# Patient Record
Sex: FEMALE | Race: WHITE
[De-identification: names, ages, dates, MRNs, and addresses within clinical notes are randomized per-mention and may not be internally consistent; named-entity substitution may affect disease eponyms.]

---
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
## 2019-04-22 ENCOUNTER — HOSPITAL ENCOUNTER (EMERGENCY)
Dept: HOSPITAL 25 - ED | Age: 19
LOS: 1 days | Discharge: HOME | End: 2019-04-23
Payer: COMMERCIAL

## 2019-04-22 DIAGNOSIS — R45.851: ICD-10-CM

## 2019-04-22 DIAGNOSIS — F32.9: Primary | ICD-10-CM

## 2019-04-22 LAB
ALBUMIN SERPL BCG-MCNC: 4.8 G/DL (ref 3.2–5.2)
ALBUMIN/GLOB SERPL: 1.7 {RATIO} (ref 1–3)
ALP SERPL-CCNC: 72 U/L (ref 34–104)
ALT SERPL W P-5'-P-CCNC: 33 U/L (ref 7–52)
ANION GAP SERPL CALC-SCNC: 8 MMOL/L (ref 2–11)
APAP SERPL-MCNC: < 15 MCG/ML
AST SERPL-CCNC: 23 U/L (ref 13–39)
BASOPHILS # BLD AUTO: 0.1 10^3/UL (ref 0–0.2)
BUN SERPL-MCNC: 14 MG/DL (ref 6–24)
BUN/CREAT SERPL: 14.6 (ref 8–20)
CALCIUM SERPL-MCNC: 10 MG/DL (ref 8.6–10.3)
CHLORIDE SERPL-SCNC: 104 MMOL/L (ref 101–111)
EOSINOPHIL # BLD AUTO: 0.1 10^3/UL (ref 0–0.6)
GLOBULIN SER CALC-MCNC: 2.8 G/DL (ref 2–4)
GLUCOSE SERPL-MCNC: 95 MG/DL (ref 70–100)
HCG SERPL QL: 1.92 MIU/ML
HCO3 SERPL-SCNC: 26 MMOL/L (ref 22–32)
HCT VFR BLD AUTO: 42 % (ref 33–41)
HGB BLD-MCNC: 13.9 G/DL (ref 12–16)
LYMPHOCYTES # BLD AUTO: 1.9 10^3/UL (ref 1–4.8)
MCH RBC QN AUTO: 28 PG (ref 27–31)
MCHC RBC AUTO-ENTMCNC: 33 G/DL (ref 31–36)
MCV RBC AUTO: 86 FL (ref 80–97)
MONOCYTES # BLD AUTO: 0.6 10^3/UL (ref 0–0.8)
NEUTROPHILS # BLD AUTO: 6.2 10^3/UL (ref 1.5–7.7)
NRBC # BLD AUTO: 0 10^3/UL
NRBC BLD QL AUTO: 0.1
PLATELET # BLD AUTO: 269 10^3/UL (ref 150–450)
POTASSIUM SERPL-SCNC: 3.9 MMOL/L (ref 3.5–5)
PROT SERPL-MCNC: 7.6 G/DL (ref 6.4–8.9)
RBC # BLD AUTO: 4.89 10^6 /UL (ref 3.7–4.87)
RBC UR QL AUTO: (no result)
SALICYLATES SERPL-MCNC: < 2.5 MG/DL (ref ?–30)
SODIUM SERPL-SCNC: 138 MMOL/L (ref 135–145)
TSH SERPL-ACNC: 2.03 MCIU/ML (ref 0.34–5.6)
WBC # BLD AUTO: 9 10^3/UL (ref 3.5–10.8)
WBC UR QL AUTO: (no result)

## 2019-04-22 PROCEDURE — 85025 COMPLETE CBC W/AUTO DIFF WBC: CPT

## 2019-04-22 PROCEDURE — 87086 URINE CULTURE/COLONY COUNT: CPT

## 2019-04-22 PROCEDURE — 80053 COMPREHEN METABOLIC PANEL: CPT

## 2019-04-22 PROCEDURE — 81015 MICROSCOPIC EXAM OF URINE: CPT

## 2019-04-22 PROCEDURE — 80307 DRUG TEST PRSMV CHEM ANLYZR: CPT

## 2019-04-22 PROCEDURE — 80329 ANALGESICS NON-OPIOID 1 OR 2: CPT

## 2019-04-22 PROCEDURE — 87077 CULTURE AEROBIC IDENTIFY: CPT

## 2019-04-22 PROCEDURE — 84702 CHORIONIC GONADOTROPIN TEST: CPT

## 2019-04-22 PROCEDURE — 81003 URINALYSIS AUTO W/O SCOPE: CPT

## 2019-04-22 PROCEDURE — 84443 ASSAY THYROID STIM HORMONE: CPT

## 2019-04-22 PROCEDURE — 99285 EMERGENCY DEPT VISIT HI MDM: CPT

## 2019-04-22 PROCEDURE — 80320 DRUG SCREEN QUANTALCOHOLS: CPT

## 2019-04-22 PROCEDURE — 36415 COLL VENOUS BLD VENIPUNCTURE: CPT

## 2019-04-22 PROCEDURE — G0480 DRUG TEST DEF 1-7 CLASSES: HCPCS

## 2019-04-22 NOTE — ED
Psychiatric Complaint





- HPI Summary


HPI Summary: 


Patient is a 17 y/o female brought in by EMS and police who presents to the ED c

/o possible SI. She is here voluntarily. 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 [facility] athletic department called 

the police because they were concerned about her. The police were searching 

through her [employer] michelle on her phone and came across a post several days ago, 

in which the patient was talking about how she threatened SI last semester. 

Patient denies any current SI and states Im unsure why Im here. She notes 

she used marijuana once several months ago. PMHx bipolar disorder and OCD, and 

patient is on Latuda and Lamictal.





- History Of Current Complaint


Time Seen by Provider: 04/22/19 18:15


Hx Obtained From: Patient, EMS


Onset/Duration: Gradual Onset, Still Present


Aggravating Factor(s): Recent Stress, Medication Non-compliance - suspended 

from soccer team


Related History: Positive For: Prior Psychiatric Issues


Has Suicidal: Denies: Thoughts





- Allergies/Home Medications


Allergies/Adverse Reactions: 


 Allergies











Allergy/AdvReac Type Severity Reaction Status Date / Time


 


No Known Allergies Allergy   Verified 12/12/18 22:25











Home Medications: 


 Home Medications





Etonogestrel [Nexplanon] 68 mg IMPLANT ONCE 04/22/19 [History Confirmed 04/22/19

]


Lurasidone(*) [Latuda] 80 mg PO DAILY 04/22/19 [History Confirmed 04/22/19]


Lurasidone(*) [Latuda] 80 mg pe PO DAILY WITH MEAL 04/22/19 [History Confirmed 

04/22/19]


lamoTRIgine [Lamotrigine ER] 200 mg PO DAILY 04/22/19 [History Confirmed 04/22/ 19]


lamoTRIgine [Lamotrigine ER] 200 mg pe PO DAILY WITH MEAL 04/22/19 [History 

Confirmed 04/22/19]











PMH/Surg Hx/FS Hx/Imm Hx


Endocrine/Hematology History: 


   Denies: Hx Anticoagulant Therapy, Hx Diabetes


Cardiovascular History: 


   Denies: Hx Hypertension


Respiratory History: Reports: Hx Asthma


 History: 


   Denies: Hx Renal Disease


Psychiatric History: Reports: Hx Bipolar Disorder, Other Psychiatric Issues/

Disorders - OCD





- Surgical History


Surgery Procedure, Year, and Place: tonsillectomy/adenoidectomy, biopsy of left 

groin lymph node


Infectious Disease History: No


Infectious Disease History: 


   Denies: Traveled Outside the [place] in Last 30 Days





- Family History


Known Family History: 


   Negative: Diabetes





- Social History


Alcohol Use: Rare


Hx Substance Use: Yes


Substance Use Type: Reports: Marijuana


Substance Use Comment - Amount & Last Used: 2 months ago


Hx Tobacco Use: No


Smoking Status (MU): Never Smoked Tobacco





Review of Systems


Negative: Fever


Negative: Other - SI


All Other Systems Reviewed And Are Negative: Yes





Physical Exam





- Summary


Physical Exam Summary: 


Appearance: Well appearing, no pain distress


Skin: warm, dry, reflects adequate perfusion


Head/face: normal


Eyes: EOMI, ELEANOR


ENT: normal


Neck: supple, non-tender


Respiratory: CTA, breath sounds present


Cardiovascular: RRR, pulses symmetrical 


Abdomen: non-tender, soft


Musculoskeletal: normal, strength/ROM intact


Neuro: normal, sensory motor intact, A&Ox3


Psych: depressed, crying


Triage Information Reviewed: Yes


Vital Signs On Initial Exam: 


 Initial Vitals











Temp Pulse Resp BP Pulse Ox


 


 98.2 F   80   18   130/80   99 


 


 04/22/19 18:25  04/22/19 18:25  04/22/19 18:25  04/22/19 18:25  04/22/19 18:25











Vital Signs Reviewed: Yes





Diagnostics





- Vital Signs


 Vital Signs











  Temp Pulse Resp BP Pulse Ox


 


 04/22/19 18:25  98.2 F  80  18  130/80  99














- Laboratory


Lab Results: 


 Lab Results











  04/22/19 04/22/19 04/22/19 Range/Units





  18:15 18:31 18:31 


 


WBC   9.0   (3.5-10.8)  10^3/uL


 


RBC   4.89 H   (3.70-4.87)  10^6 /uL


 


Hgb   13.9   (12.0-16.0)  g/dL


 


Hct   42 H   (33-41)  %


 


MCV   86   (80-97)  fL


 


MCH   28   (27-31)  pg


 


MCHC   33   (31-36)  g/dL


 


RDW   15   (10.5-15)  %


 


Plt Count   269   (150-450)  10^3/uL


 


MPV   8.1   (7.4-10.4)  fL


 


Neut % (Auto)   69.4   %


 


Lymph % (Auto)   21.3   %


 


Mono % (Auto)   6.7   %


 


Eos % (Auto)   1.6   %


 


Baso % (Auto)   1.0   %


 


Absolute Neuts (auto)   6.2   (1.5-7.7)  10^3/ul


 


Absolute Lymphs (auto)   1.9   (1.0-4.8)  10^3/ul


 


Absolute Monos (auto)   0.6   (0-0.8)  10^3/ul


 


Absolute Eos (auto)   0.1   (0-0.6)  10^3/ul


 


Absolute Basos (auto)   0.1   (0-0.2)  10^3/ul


 


Absolute Nucleated RBC   0   10^3/ul


 


Nucleated RBC %   0.1   


 


Sodium    138  (135-145)  mmol/L


 


Potassium    3.9  (3.5-5.0)  mmol/L


 


Chloride    104  (101-111)  mmol/L


 


Carbon Dioxide    26  (22-32)  mmol/L


 


Anion Gap    8  (2-11)  mmol/L


 


BUN    14  (6-24)  mg/dL


 


Creatinine    0.96 H  (0.51-0.95)  mg/dL


 


Est GFR ( Amer)    90.6  (>60)  


 


Est GFR (Non-Af Amer)    74.9  (>60)  


 


BUN/Creatinine Ratio    14.6  (8-20)  


 


Glucose    95  ()  mg/dL


 


Calcium    10.0  (8.6-10.3)  mg/dL


 


Total Bilirubin    0.70  (0.2-1.0)  mg/dL


 


AST    23  (13-39)  U/L


 


ALT    33  (7-52)  U/L


 


Alkaline Phosphatase    72  ()  U/L


 


Total Protein    7.6  (6.4-8.9)  g/dL


 


Albumin    4.8  (3.2-5.2)  g/dL


 


Globulin    2.8  (2-4)  g/dL


 


Albumin/Globulin Ratio    1.7  (1-3)  


 


TSH    Pending  


 


Beta HCG, Quant    1.92  mIU/mL


 


Urine Color  Yellow    


 


Urine Appearance  Cloudy    


 


Urine pH  5.0    (5-9)  


 


Ur Specific Gravity  1.025    (1.010-1.030)  


 


Urine Protein  Negative    (Negative)  


 


Urine Ketones  Trace A    (Negative)  


 


Urine Blood  3+ A    (Negative)  


 


Urine Nitrate  Negative    (Negative)  


 


Urine Bilirubin  Negative    (Negative)  


 


Urine Urobilinogen  Negative    (Negative)  


 


Ur Leukocyte Esterase  Negative    (Negative)  


 


Urine WBC (Auto)  Trace(0-5/hpf)    (Absent)  


 


Urine RBC (Auto)  Trace(0-2/hpf)    (Absent)  


 


Ur Squamous Epith Cells  Present A    (Absent)  


 


Urine Bacteria  Absent    (Absent)  


 


Urine Glucose  Negative    (Negative)  


 


Salicylates    Pending  


 


Acetaminophen    Pending  


 


Serum Alcohol    Pending  











Result Diagrams: 


 04/22/19 18:31





 04/22/19 18:31


Lab Statement: Any lab studies that have been ordered have been reviewed, and 

results considered in the medical decision making process.





Re-Evaluation





- Re-Evaluation


  ** First Eval


Re-Evaluation Time: 19:15


Change: Unchanged


Comment: Pt is medically cleared for a MHE.





Course/Dx





- Course


Course Of Treatment: Patient is a 17 y/o female brought in by EMS and police 

who presents to the ED c/o possible SI. Patient denies any current SI and 

states Im unsure why Im here. PMHx bipolar disorder and OCD, and patient is 

on Latuda and Lamictal. A physical exam revealed depressed and crying. Bloodwork

/UA obtained. Patient will be signed out to Dr. Walker at shift change, pending 

MHE. Final dx of depression and SI. She is agreeable with this plan.





- Differential Dx/Clinical Impression


Differential Diagnosis/HQI/PQRI: Positive: Suicidal Ideation


Provider Diagnosis: 


 Depression, Suicidal ideation








Discharge





- Sign-Out/Discharge


Documenting (check all that apply): Sign-Out Patient


Signing out patient TO: Klever Walker


Patient Received Moderate/Deep Sedation with Procedure: No





- Discharge Plan


Referrals: 


No Primary Care Phys,NOPCP [Primary Care Provider] - 





- Attestation Statements


Document Initiated by Scribe: Yes


Documenting Scribe: Deena Reyes


Provider For Whom Scribe is Documenting (Include Credential): Tal Caldwell MD


Scribe Attestation: 


Deena GERONIMO, scribed for Tal Caldwell MD on 04/22/19 at 2108. 


Scribe Documentation Reviewed: Yes


Provider Attestation: 


The documentation as recorded by the Deena frankel accurately reflects the 

service I personally performed and the decisions made by me, Tal Caldwell MD


Status of Scribe Document: Viewed

## 2019-04-22 NOTE — ED
Progress





- Progress Note


Progress Note: 





RECEIVING SIGN OUT FROM DR. TOLEDO AT SHIFT CHANGE, PENDING MHE.





Pt is a 19 y/o F brought in by ambulance and police presenting to ED for MHE 

due to possible SI. 





- Consult/PCP


Time Called: 19:37





Re-Evaluation





- Re-Evaluation


  ** First Eval


Re-Evaluation Time: 19:15


Change: Unchanged


Comment: Pt is medically cleared for a MHE.





Course/Dx





- Course


Course Of Treatment: RECEIVING SIGN OUT FROM DR. TOLEDO AT SHIFT CHANGE, 

PENDING MHE.  Pt is a 19 y/o F brought in by ambulance and police presenting to 

ED for MHE due to possible SI.  Pt is on MH hold for re-eval in the AM.  

SIGNING PT OUT TO DR. TOLEDO AT SHIFT CHANGE PENDING MHE.





- Diagnoses


Provider Diagnoses: 


 Depression, Suicidal ideation








Discharge





- Sign-Out/Discharge


Documenting (check all that apply): Sign-Out Patient, Receiving Sign-Out


Signing out patient TO: Tal Toledo - pending MHE


Receiving patient FROM: Tal Toledo - pending MHE


Patient Received Moderate/Deep Sedation with Procedure: No





- Discharge Plan


Condition: Good


Referrals: 


No Primary Care Phys,NOPCP [Primary Care Provider] - 





- Billing Disposition and Condition


Condition: GOOD





- Attestation Statements


Document Initiated by Scribe: Yes


Documenting Scribe: Angélica Lane


Provider For Whom Scribe is Documenting (Include Credential): Dr. Klever Walker MD


Scribe Attestation: 


Angélica GERONIMO scribed for Dr. Klever Walker MD on 04/23/19 at 0430. 


Scribe Documentation Reviewed: Yes


Provider Attestation: 


The documentation as recorded by the Angélica frankel accurately 

reflects the service I personally performed and the decisions made by me, Dr. Klever Walker MD


Status of Scribe Document: Viewed

## 2019-04-23 VITALS — DIASTOLIC BLOOD PRESSURE: 68 MMHG | SYSTOLIC BLOOD PRESSURE: 127 MMHG

## 2019-04-23 NOTE — ED
Progress





- Progress Note


Progress Note: 





This patient was signed out from Dr. Walker to Dr. Caldwell upon shift change at 

07:00 04/23/19 pending MHE. Dr. Sue reports that the patient has been cleared 

for discharge. Dx: depression





- Consult/PCP


Time Called: 19:37





Re-Evaluation





- Re-Evaluation


  ** First Eval


Re-Evaluation Time: 19:15


Change: Unchanged


Comment: Pt is medically cleared for a MHE.





Course/Dx





- Course


Course Of Treatment: This patient was signed out from Dr. Walker to Dr. Caldwell 

upon shift change at 07:00 04/23/19 pending MHE. Dr. Sue reports that the 

patient has been cleared for discharge. Dx: depression





- Diagnoses


Provider Diagnoses: 


 Depression








- Provider Notifications


Discussed Care Of Patient With: Osito Sue


Time Discussed With Above Provider: 09:00


Instructed by Provider To: Other - Dr. Sue reports that the patient has been 

cleared for discharge. Dx: depression





Discharge





- Sign-Out/Discharge


Documenting (check all that apply): Patient Departure - DC


Patient Received Moderate/Deep Sedation with Procedure: No





- Discharge Plan


Condition: Stable


Disposition: HOME


Referrals: 


No Primary Care Phys,NOPCP [Primary Care Provider] - 


Additional Instructions: 


Patient to be discharged from ED.  Patient had been admitted to the ED after 

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  

Patient also stated that she had been suspended from the soccer team for 

drinking.  Patient states that the post was from December, and she currently 

denies that she has SI or SP.  Patient states that she has support of a friend 

who is not on the soccer team.  She also stated that she does not mind if she 

is permanently kicked off the soccer team.  Patient sees Meghana Kelly 

psychiatrist at [facility] services for counseling and medication.  

This writer confirmed that patient has an appointment for today at 1 PM.








IMPORTANT PHONE NUMBERS:





[facility] Behavioral Services Unit: [phone]


[facility] Emergency Room Behavioral Pod: [phone]





Suicide Prevention and Crisis Services: [phone]


The Chat: Text (871) 028-5584 (free and confidential online crisis service 

sponsored by [facility] Suicide Prevention, available Monday-Friday 6pm  

9pm)


National Suicide Prevention Lifeline: (181) 295-WNED (4265)


National Crisis Text Line: Text HELLO to 905157


[facility]: [phone]


[facility] Outreach for Older Adults: [phone]


[facility] Mental Health Association: [phone]


[facility]: [phone]





Alcoholics Anonymous: [phone]


Narcotics Anonymous: [phone] 956-8952


Alcohol and Drug [facility]: [phone]


[facility] Outpatient Services: [phone]


[facility]: [phone]


Alcohol & Drug Crisis: [phone]


[facility]: [phone]





Department of : [phone]


[facility]: [phone]


[facility]: [phone]


[facility]: [phone]


[facility] Services: [phone]


[facility]: [phone]


[facility]: [phone]


[facility] ACT Team: [phone]





[facility]: [phone]


[facility]





- Billing Disposition and Condition


Condition: STABLE


Disposition: Home





- Attestation Statements


Document Initiated by Scribe: Yes


Documenting Scribe: Kaleb Desir


Provider For Whom Scribe is Documenting (Include Credential): Tal Caldwell MD


Scribe Attestation: 


I, Kaleb Desir, scribed for Tal Caldwell MD on 04/23/19 at 1004. 


Scribe Documentation Reviewed: Yes


Provider Attestation: 


The documentation as recorded by the Kaleb frankel accurately 

reflects the service I personally performed and the decisions made by me, 

Tal Caldwell MD


Status of Scribe Document: Viewed

## 2019-04-25 NOTE — PN
Progress Note





- Progress Note


Date of Service: 04/22/19


Note: 


Pt. seen in ED 4/22 for MHE. Urinalysis and culture obtained. No urinary 

complaints noted in ED chart. Urine culture growing 1-10K normal edith and GBS. 

Will not treat at this time.

## 2019-12-04 ENCOUNTER — HOSPITAL ENCOUNTER (EMERGENCY)
Dept: HOSPITAL 25 - ED | Age: 19
Discharge: HOME | End: 2019-12-04
Payer: COMMERCIAL

## 2019-12-04 VITALS — DIASTOLIC BLOOD PRESSURE: 76 MMHG | SYSTOLIC BLOOD PRESSURE: 117 MMHG

## 2019-12-04 DIAGNOSIS — R59.1: Primary | ICD-10-CM

## 2019-12-04 PROCEDURE — 87389 HIV-1 AG W/HIV-1&-2 AB AG IA: CPT

## 2019-12-04 PROCEDURE — 36415 COLL VENOUS BLD VENIPUNCTURE: CPT

## 2019-12-04 PROCEDURE — 99282 EMERGENCY DEPT VISIT SF MDM: CPT

## 2019-12-04 NOTE — ED
Skin Complaint





- HPI Summary


HPI Summary: 





Patient complains of sudden onset lump in right groin starting today.  Tender 

to palpation.  Denies symptoms of illness or trauma.  Patient states she has 

history of same on left side groin which was evaluated for lymphoma with 

negative biopsy, but positive for mononucleosis..  Patient denies fever, cough, 

sore throat, CP, SOB, N/3/D, bowel pain, change in hearing, change in BM, 

vaginal symptoms.





- History of Current Complaint


Chief Complaint: EDExtremityLower


Time Seen by Provider: 12/04/19 01:50


Stated Complaint: POS GLANDS SWOLLEN IN GROIN PER PT


Hx Obtained From: Patient


Onset/Duration: Started Hours Ago


Skin Exposure Onset/Duration: Hours Ago


Timing: Constant


Onset Severity: Moderate


Current Severity: Moderate


Pain Intensity: 6


Pain Scale Used: 0-10 Numeric


Skin Location: Discrete


Aggravating Symptom(s): Touch


Alleviating Symptom(s): Nothing


Associated Signs & Symptoms: Negative





- Allergy/Home Medications


Allergies/Adverse Reactions: 


 Allergies











Allergy/AdvReac Type Severity Reaction Status Date / Time


 


No Known Allergies Allergy   Verified 12/04/19 00:52














PMH/Surg Hx/FS Hx/Imm Hx


Endocrine/Hematology History: 


   Denies: Hx Anticoagulant Therapy, Hx Diabetes


Cardiovascular History: 


   Denies: Hx Hypertension


Respiratory History: Reports: Hx Asthma


 History: 


   Denies: Hx Renal Disease


Sensory History: 


   Denies: Hx Eye Prosthesis


Opthamlomology History: 


   Denies: Hx Legally Blind


EENT History: 


   Denies: Hx Deafness


Neurological History: 


   Denies: Hx Dementia


Psychiatric History: Reports: Hx Bipolar Disorder, Other Psychiatric Issues/

Disorders - OCD


   Denies: Hx Eating Disorder - pt reports binging and restricting, Hx of 

Violent Episodes Against Others





- Surgical History


Surgery Procedure, Year, and Place: tonsillectomy/adenoidectomy, biopsy of left 

groin lymph node


Infectious Disease History: No


Infectious Disease History: 


   Denies: Traveled Outside the [place] in Last 30 Days





- Family History


Known Family History: 


   Negative: Diabetes





- Social History


Alcohol Use: Rare


Hx Substance Use: Yes


Substance Use Type: Reports: Marijuana


Substance Use Comment - Amount & Last Used: 11 months


Hx Tobacco Use: No


Smoking Status (MU): Never Smoked Tobacco





Review of Systems


Constitutional: Negative


Eyes: Negative


ENT: Negative


Cardiovascular: Negative


Respiratory: Negative


Gastrointestinal: Negative


Genitourinary: Negative


Musculoskeletal: Negative


Skin: Other


Neurological: Negative


Psychological: Normal


All Other Systems Reviewed And Are Negative: Yes





Physical Exam





- Summary


Physical Exam Summary: 





Small 2 cm x 2 cm hard mass in lower right inguinal crease.  No evidence of 

fluctuance.  No rash.  Tender to palpation.


Triage Information Reviewed: Yes


Vital Signs On Initial Exam: 


 Initial Vitals











Temp Pulse Resp BP Pulse Ox


 


 98.0 F   72   18   125/95   98 


 


 12/04/19 00:52  12/04/19 00:52  12/04/19 00:52  12/04/19 00:52  12/04/19 00:52











Vital Signs Reviewed: Yes


Appearance: Positive: Well-Appearing


Skin: Positive: Warm


Head/Face: Positive: Normal Head/Face Inspection


Eyes: Positive: Normal


ENT: Positive: Normal ENT inspection


Neck: Positive: Supple


Respiratory/Lung Sounds: Positive: Clear to Auscultation


Cardiovascular: Positive: Normal


Abdomen Description: Positive: Nontender


Musculoskeletal: Positive: Normal


Neurological: Positive: Normal


Psychiatric: Positive: Normal


AVPU Assessment: Alert





- Ji Coma Scale


Best Eye Response: 4 - Spontaneous


Best Motor Response: 6 - Obeys Commands


Best Verbal Response: 5 - Oriented


Coma Scale Total: 15





Procedures





- Sedation


Patient Received Moderate/Deep Sedation with Procedure: No





Diagnostics





- Vital Signs


 Vital Signs











  Temp Pulse Resp BP Pulse Ox


 


 12/04/19 01:42  97.7 F  80  16  140/76  99


 


 12/04/19 00:52  98.0 F  72  18  125/95  98














- Laboratory


Lab Statement: Any lab studies that have been ordered have been reviewed, and 

results considered in the medical decision making process.





Course/Dx





- Course


Course Of Treatment: Patient complains of sudden onset lump in right groin 

starting today.  Tender to palpation.  Denies symptoms of illness or trauma.  

Patient states she has history of same on left side groin which was evaluated 

for lymphoma with negative biopsy, but positive for mononucleosis..  Patient 

denies fever, cough, sore throat, CP, SOB, N/3/D, bowel pain, change in hearing

, change in BM, vaginal symptoms.  Vital signs within normal limits.  Discussed 

patient with the attending Dr. galloway who recommended follow-up with surgery 

due to patient's history.  Patient understands and approves of plan.





- Diagnoses


Provider Diagnoses: 


 Swelling of lymph node








Discharge ED





- Sign-Out/Discharge


Documenting (check all that apply): Patient Departure





- Discharge Plan


Condition: Stable


Disposition: HOME


Patient Education Materials:  Lymphadenopathy (ED)


Referrals: 


No Primary Care Phys,NOPCP [Primary Care Provider] - 


Pj Chaudhary MD [Medical Doctor] - 


Additional Instructions: 


Follow-up with surgery Dr. Chaudhary for further evaluation of lymph node.





- Billing Disposition and Condition


Condition: STABLE


Disposition: Home

## 2022-08-19 ENCOUNTER — HOSPITAL ENCOUNTER (EMERGENCY)
Facility: MEDICAL CENTER | Age: 22
End: 2022-08-19
Attending: EMERGENCY MEDICINE
Payer: COMMERCIAL

## 2022-08-19 ENCOUNTER — APPOINTMENT (OUTPATIENT)
Dept: RADIOLOGY | Facility: MEDICAL CENTER | Age: 22
End: 2022-08-19
Attending: EMERGENCY MEDICINE
Payer: COMMERCIAL

## 2022-08-19 VITALS
BODY MASS INDEX: 15.87 KG/M2 | HEIGHT: 69 IN | RESPIRATION RATE: 20 BRPM | SYSTOLIC BLOOD PRESSURE: 106 MMHG | OXYGEN SATURATION: 97 % | HEART RATE: 62 BPM | WEIGHT: 107.14 LBS | TEMPERATURE: 97.9 F | DIASTOLIC BLOOD PRESSURE: 79 MMHG

## 2022-08-19 DIAGNOSIS — R10.30 LOWER ABDOMINAL PAIN: ICD-10-CM

## 2022-08-19 LAB
ALBUMIN SERPL BCP-MCNC: 5.1 G/DL (ref 3.2–4.9)
ALBUMIN/GLOB SERPL: 1.9 G/DL
ALP SERPL-CCNC: 39 U/L (ref 30–99)
ALT SERPL-CCNC: 17 U/L (ref 2–50)
ANION GAP SERPL CALC-SCNC: 12 MMOL/L (ref 7–16)
APPEARANCE UR: CLEAR
AST SERPL-CCNC: 19 U/L (ref 12–45)
BASOPHILS # BLD AUTO: 1 % (ref 0–1.8)
BASOPHILS # BLD: 0.07 K/UL (ref 0–0.12)
BILIRUB SERPL-MCNC: 0.5 MG/DL (ref 0.1–1.5)
BILIRUB UR QL STRIP.AUTO: NEGATIVE
BUN SERPL-MCNC: 7 MG/DL (ref 8–22)
CALCIUM SERPL-MCNC: 9.7 MG/DL (ref 8.5–10.5)
CHLORIDE SERPL-SCNC: 105 MMOL/L (ref 96–112)
CO2 SERPL-SCNC: 23 MMOL/L (ref 20–33)
COLOR UR: YELLOW
CREAT SERPL-MCNC: 0.62 MG/DL (ref 0.5–1.4)
EOSINOPHIL # BLD AUTO: 0.01 K/UL (ref 0–0.51)
EOSINOPHIL NFR BLD: 0.1 % (ref 0–6.9)
ERYTHROCYTE [DISTWIDTH] IN BLOOD BY AUTOMATED COUNT: 38.4 FL (ref 35.9–50)
GFR SERPLBLD CREATININE-BSD FMLA CKD-EPI: 128 ML/MIN/1.73 M 2
GLOBULIN SER CALC-MCNC: 2.7 G/DL (ref 1.9–3.5)
GLUCOSE SERPL-MCNC: 88 MG/DL (ref 65–99)
GLUCOSE UR STRIP.AUTO-MCNC: NEGATIVE MG/DL
HCG SERPL QL: NEGATIVE
HCT VFR BLD AUTO: 42.1 % (ref 37–47)
HGB BLD-MCNC: 14 G/DL (ref 12–16)
IMM GRANULOCYTES # BLD AUTO: 0.01 K/UL (ref 0–0.11)
IMM GRANULOCYTES NFR BLD AUTO: 0.1 % (ref 0–0.9)
KETONES UR STRIP.AUTO-MCNC: NEGATIVE MG/DL
LEUKOCYTE ESTERASE UR QL STRIP.AUTO: NEGATIVE
LIPASE SERPL-CCNC: 28 U/L (ref 11–82)
LYMPHOCYTES # BLD AUTO: 2.89 K/UL (ref 1–4.8)
LYMPHOCYTES NFR BLD: 40.6 % (ref 22–41)
MCH RBC QN AUTO: 28.1 PG (ref 27–33)
MCHC RBC AUTO-ENTMCNC: 33.3 G/DL (ref 33.6–35)
MCV RBC AUTO: 84.5 FL (ref 81.4–97.8)
MICRO URNS: NORMAL
MONOCYTES # BLD AUTO: 0.46 K/UL (ref 0–0.85)
MONOCYTES NFR BLD AUTO: 6.5 % (ref 0–13.4)
NEUTROPHILS # BLD AUTO: 3.68 K/UL (ref 2–7.15)
NEUTROPHILS NFR BLD: 51.7 % (ref 44–72)
NITRITE UR QL STRIP.AUTO: NEGATIVE
NRBC # BLD AUTO: 0 K/UL
NRBC BLD-RTO: 0 /100 WBC
PH UR STRIP.AUTO: 7.5 [PH] (ref 5–8)
PLATELET # BLD AUTO: 296 K/UL (ref 164–446)
PMV BLD AUTO: 11 FL (ref 9–12.9)
POTASSIUM SERPL-SCNC: 4 MMOL/L (ref 3.6–5.5)
PROT SERPL-MCNC: 7.8 G/DL (ref 6–8.2)
PROT UR QL STRIP: NEGATIVE MG/DL
RBC # BLD AUTO: 4.98 M/UL (ref 4.2–5.4)
RBC UR QL AUTO: NEGATIVE
SODIUM SERPL-SCNC: 140 MMOL/L (ref 135–145)
SP GR UR STRIP.AUTO: 1.01
UROBILINOGEN UR STRIP.AUTO-MCNC: 0.2 MG/DL
WBC # BLD AUTO: 7.1 K/UL (ref 4.8–10.8)

## 2022-08-19 PROCEDURE — 74177 CT ABD & PELVIS W/CONTRAST: CPT

## 2022-08-19 PROCEDURE — 99284 EMERGENCY DEPT VISIT MOD MDM: CPT | Mod: EDC

## 2022-08-19 PROCEDURE — 85025 COMPLETE CBC W/AUTO DIFF WBC: CPT

## 2022-08-19 PROCEDURE — 76856 US EXAM PELVIC COMPLETE: CPT

## 2022-08-19 PROCEDURE — 81003 URINALYSIS AUTO W/O SCOPE: CPT

## 2022-08-19 PROCEDURE — 96374 THER/PROPH/DIAG INJ IV PUSH: CPT | Mod: EDC

## 2022-08-19 PROCEDURE — 700117 HCHG RX CONTRAST REV CODE 255: Performed by: EMERGENCY MEDICINE

## 2022-08-19 PROCEDURE — 80053 COMPREHEN METABOLIC PANEL: CPT

## 2022-08-19 PROCEDURE — 84703 CHORIONIC GONADOTROPIN ASSAY: CPT

## 2022-08-19 PROCEDURE — 700111 HCHG RX REV CODE 636 W/ 250 OVERRIDE (IP): Performed by: EMERGENCY MEDICINE

## 2022-08-19 PROCEDURE — 36415 COLL VENOUS BLD VENIPUNCTURE: CPT | Mod: EDC

## 2022-08-19 PROCEDURE — 83690 ASSAY OF LIPASE: CPT

## 2022-08-19 RX ORDER — ONDANSETRON 4 MG/1
4 TABLET, ORALLY DISINTEGRATING ORAL EVERY 8 HOURS PRN
Qty: 10 TABLET | Refills: 0 | Status: SHIPPED | OUTPATIENT
Start: 2022-08-19 | End: 2022-08-22

## 2022-08-19 RX ORDER — KETOROLAC TROMETHAMINE 30 MG/ML
15 INJECTION, SOLUTION INTRAMUSCULAR; INTRAVENOUS ONCE
Status: COMPLETED | OUTPATIENT
Start: 2022-08-19 | End: 2022-08-19

## 2022-08-19 RX ADMIN — IOHEXOL 100 ML: 350 INJECTION, SOLUTION INTRAVENOUS at 11:30

## 2022-08-19 RX ADMIN — KETOROLAC TROMETHAMINE 15 MG: 30 INJECTION, SOLUTION INTRAMUSCULAR at 12:32

## 2022-08-19 NOTE — DISCHARGE INSTRUCTIONS
Unfortunately the exact cause of your abdominal pain is unclear.  You may have had a cyst that ruptured and is improving or you may be constipated.  There is no evidence that you are significantly dehydrated, have a bladder infection or an infection in your colon.  Drink plenty of fluids and eat a bland diet.  You must return to the ER immediately if you develop fever or worsening pain or any new or different symptoms.  I hope you feel better soon.

## 2022-08-19 NOTE — ED NOTES
Patient roomed. RN assessment completed. C/O lower abdomen/pelvic area burning sensation. Completed a 12hr shift last night at [employer] and came to ER due to persistent pains. Able to take po fluids but pain with eating foods. Advised of wait times/plan of care. Whiteboard updated and call light instructed. ERP to see.

## 2022-08-19 NOTE — ED NOTES
Pilar ZAZUETA/Natty.  Discharge instructions including s/s to return to ED, follow up appointments, hydration importance and abdominal pain provided to pt/family.    Parents verbalized understanding with no further questions and concerns.    Copy of discharge provided to pt/family.  Signed copy in chart.    Prescription for magnesium citrate/ zofran provided to pt.   Pt walked out of department; pt in NAD, awake, alert, interactive and age appropriate.

## 2022-08-19 NOTE — ED NOTES
Pt resting comfortably with family bedside. Monitors intact. All questions and concerns addressed.

## 2022-08-19 NOTE — ED TRIAGE NOTES
"Chief Complaint   Patient presents with    Abdominal Pain     Patient reports lower abdominal \"burning and stabbing\" x3days. Patient denies N/V, but states she had an episode of diarrhea a few days ago       20 yo female to triage for above complaint. Patient reports she has had slight burning sensation when she urinates, but denies blood in her urine.  Protocol ordered.    Pt is alert and oriented, speaking in full sentences, follows commands and responds appropriately to questions.     Patient placed back in lobby and educated on triage process. Asked to inform RN of any changes.    /89   Pulse 70   Temp 36.3 °C (97.4 °F) (Tympanic)   Resp 14   Ht 1.753 m (5' 9\")   Wt 48.6 kg (107 lb 2.3 oz)   SpO2 99%   BMI 15.82 kg/m²     "

## 2022-08-19 NOTE — ED PROVIDER NOTES
"ED Provider Note    Scribed for Pascale Mayorga M.D. by Virginie Linares. 8/19/2022, 8:04 AM.    Primary care provider: MESSI Jackson  Means of arrival: Walk-in  History obtained from: Patient  History limited by: None noted    CHIEF COMPLAINT  Chief Complaint   Patient presents with    Abdominal Pain     Patient reports lower abdominal \"burning and stabbing\" x3days. Patient denies N/V, but states she had an episode of diarrhea a few days ago       HPI  Pilar Garcia is a 21 y.o. female who presents to the Emergency Department with lower abdominal pain onset 3 days ago. Patient states her pain is exacerbated with eating. She describes her pain as a burning and stabbing pain between her hip bones. She endorses associated occasional left shoulder pain, left back pain and nausea. Nothing seems to make it better. She denies any dysuria, diarrhea, chest pain, cough or vomiting.  No Vaginal bleeding or vaginal discharge.  She denies any history of abdominal surgery.    REVIEW OF SYSTEMS  Pertinent positives include lower abdominal pain, left shoulder pain, left back pain and nausea. Pertinent negatives include no dysuria, diarrhea, chest pain, cough or vomiting. All other systems reviewed and negative.     PAST MEDICAL HISTORY   None noted    SURGICAL HISTORY   has a past surgical history that includes knee arthroscopy (Left, 8/8/2016).    SOCIAL HISTORY  Social History     Tobacco Use    Smoking status: Never    Smokeless tobacco: Never   Substance Use Topics    Alcohol use: No    Drug use: No      Social History     Substance and Sexual Activity   Drug Use No       FAMILY HISTORY  None noted    CURRENT MEDICATIONS  Home Medications       Reviewed by Nemo Mcdonough R.N. (Registered Nurse) on 08/19/22 at 0729  Med List Status: Partial     Medication Last Dose Status   hydrocodone-acetaminophen (NORCO) 5-325 MG Tab per tablet  Active   naproxen (NAPROSYN) 250 MG TABS  Active                    ALLERGIES  No Known " "Allergies    PHYSICAL EXAM  VITAL SIGNS: /89   Pulse 70   Temp 36.3 °C (97.4 °F) (Tympanic)   Resp 14   Ht 1.753 m (5' 9\")   Wt 48.6 kg (107 lb 2.3 oz)   SpO2 99%   BMI 15.82 kg/m²     Constitutional: Well developed, No acute distress, Non-toxic appearance.   HENT: Normocephalic, Atraumatic, Bilateral external ears normal,  Nose normal.   Eyes: PERRL, EOMI, Conjunctiva normal.    Neck: Normal range of motion, No tenderness, Supple.    Cardiovascular: Normal heart rate, Normal rhythm.    Thorax & Lungs: Normal breath sounds, No respiratory distress.    Abdomen: Diffused lower abdominal tenderness, no focal right lower quadrant tenderness no distention, no guarding, no rebound.   Skin: Warm, Dry, No erythema, No rash.   Back: No tenderness, No CVA tenderness.   Extremities: Intact distal pulses, No edema, No tenderness   Neurologic: Alert & oriented x 3, Normal motor function, Normal sensory function, No focal deficits noted.   Psychiatric: Appropriate                                                     DIAGNOSTIC STUDIES / PROCEDURES\    LABS  Results for orders placed or performed during the hospital encounter of 08/19/22   CBC WITH DIFFERENTIAL   Result Value Ref Range    WBC 7.1 4.8 - 10.8 K/uL    RBC 4.98 4.20 - 5.40 M/uL    Hemoglobin 14.0 12.0 - 16.0 g/dL    Hematocrit 42.1 37.0 - 47.0 %    MCV 84.5 81.4 - 97.8 fL    MCH 28.1 27.0 - 33.0 pg    MCHC 33.3 (L) 33.6 - 35.0 g/dL    RDW 38.4 35.9 - 50.0 fL    Platelet Count 296 164 - 446 K/uL    MPV 11.0 9.0 - 12.9 fL    Neutrophils-Polys 51.70 44.00 - 72.00 %    Lymphocytes 40.60 22.00 - 41.00 %    Monocytes 6.50 0.00 - 13.40 %    Eosinophils 0.10 0.00 - 6.90 %    Basophils 1.00 0.00 - 1.80 %    Immature Granulocytes 0.10 0.00 - 0.90 %    Nucleated RBC 0.00 /100 WBC    Neutrophils (Absolute) 3.68 2.00 - 7.15 K/uL    Lymphs (Absolute) 2.89 1.00 - 4.80 K/uL    Monos (Absolute) 0.46 0.00 - 0.85 K/uL    Eos (Absolute) 0.01 0.00 - 0.51 K/uL    Baso " (Absolute) 0.07 0.00 - 0.12 K/uL    Immature Granulocytes (abs) 0.01 0.00 - 0.11 K/uL    NRBC (Absolute) 0.00 K/uL   COMP METABOLIC PANEL   Result Value Ref Range    Sodium 140 135 - 145 mmol/L    Potassium 4.0 3.6 - 5.5 mmol/L    Chloride 105 96 - 112 mmol/L    Co2 23 20 - 33 mmol/L    Anion Gap 12.0 7.0 - 16.0    Glucose 88 65 - 99 mg/dL    Bun 7 (L) 8 - 22 mg/dL    Creatinine 0.62 0.50 - 1.40 mg/dL    Calcium 9.7 8.5 - 10.5 mg/dL    AST(SGOT) 19 12 - 45 U/L    ALT(SGPT) 17 2 - 50 U/L    Alkaline Phosphatase 39 30 - 99 U/L    Total Bilirubin 0.5 0.1 - 1.5 mg/dL    Albumin 5.1 (H) 3.2 - 4.9 g/dL    Total Protein 7.8 6.0 - 8.2 g/dL    Globulin 2.7 1.9 - 3.5 g/dL    A-G Ratio 1.9 g/dL   LIPASE   Result Value Ref Range    Lipase 28 11 - 82 U/L   HCG QUAL SERUM   Result Value Ref Range    Beta-Hcg Qualitative Serum Negative Negative   URINALYSIS,CULTURE IF INDICATED    Specimen: Blood   Result Value Ref Range    Color Yellow     Character Clear     Specific Gravity 1.010 <1.035    Ph 7.5 5.0 - 8.0    Glucose Negative Negative mg/dL    Ketones Negative Negative mg/dL    Protein Negative Negative mg/dL    Bilirubin Negative Negative    Urobilinogen, Urine 0.2 Negative    Nitrite Negative Negative    Leukocyte Esterase Negative Negative    Occult Blood Negative Negative    Micro Urine Req see below    ESTIMATED GFR   Result Value Ref Range    GFR (CKD-EPI) 128 >60 mL/min/1.73 m 2     All labs reviewed by me.    RADIOLOGY  US-PELVIC COMPLETE (TRANSABDOMINAL/TRANSVAGINAL) (COMBO)   Final Result      1.  Transvaginal pelvic ultrasound within normal limits.      CT-ABDOMEN-PELVIS WITH   Final Result      1.  No acute abnormalities are identified in the abdomen or pelvis.      2.  Small amount of free fluid is noted in the pelvis which can be within normal limits.      3.  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        The radiologist's interpretation of all radiological studies have been reviewed by me.    COURSE &  MEDICAL DECISION MAKING  Nursing notes, VS, PMSFHx reviewed in chart.     Patient presents to the emergency department with lower abdominal pain.  This been on and off for the last 3 days.  No nausea or vomiting I do not suspect obstruction.  Only a little diarrhea but that is since resolved.  No vaginal bleeding or vaginal discharge.  No dysuria.  Will obtain labs and CT to further evaluate her pain.  There is no focal right lower quadrant tenderness to suggest appendicitis.    8:04 AM Patient seen and examined at bedside. The patient presents with abdominal pain and the differential diagnosis includes but is not limited to constipation, UTI, Ovarian cyst or appendicitis. Ordered for CBC with differential, CMP, Lipase, HCG Qual Serum, UA Culture and CT-Abdomen-Pelvis with to evaluate. She was informed of the plan for imaging and labs. She denies any current nausea.    10:48 AM - Patient was seen at bedside. I updated her that we are still pending CT.     CT scan has returned and shows no acute abnormalities.  Patient has normal white count.  No anemia.  Pregnancy test was negative.  Chemistry panel was unremarkable.  Urine showed no evidence of infection.  Patient however still concerned about the pain.  There was evidence of trace free fluid on CT and therefore an ultrasound will be ordered to rule out torsion or cyst.    11:37 AM - Patient was seen at bedside. I updated her on CT results as detailed above. The patient states she is still having pain. Will plan for ultrasound. Ordered for US-Pelvic Complete.    12:05 PM - Patient will be treated with Toradol 15 mg.    Ultrasound has returned and showed no abnormalities.  I have advised her at this point it is unclear what caused her pain.  She may be mildly constipated and therefore will be treated with mag citrate.  She was given abdominal pain precautions and advised that she needs to return in 12 hours if she continues to have pain.    1:42 PM - Patient updated  on the plan of care including discharge home with Magnesium Citrate and Zofran. She was advised of all return precautions. Patient verbalizes understanding and agreement to this plan of care.     The patient will return for new or worsening symptoms and is stable at the time of discharge.    DISPOSITION:  Patient will be discharged home in stable condition.    FOLLOW UP:  [facility], Emergency Dept  [address]  [place] [id number] [phone]    If symptoms worsen, return to the er.    OUTPATIENT MEDICATIONS:  New Prescriptions    MAGNESIUM CITRATE SOLUTION    Take 300 mL by mouth one time for 1 dose.    ONDANSETRON (ZOFRAN ODT) 4 MG TABLET DISPERSIBLE    Take 1 Tablet by mouth every 8 hours as needed for Nausea for up to 3 days.        FINAL IMPRESSION  1. Lower abdominal pain          Virginie CONNORS (Scribe), am scribing for, and in the presence of, Pascale Mayorga M.D..    Electronically signed by: Virginie Linares (Scribe), 8/19/2022    Pascale CONNORS M.D. personally performed the services described in this documentation, as scribed by Virginie Linares in my presence, and it is both accurate and complete.    The note accurately reflects work and decisions made by me.  Pascale Mayorga M.D.  8/19/2022  3:31 PM

## 2022-08-26 ENCOUNTER — TELEPHONE (OUTPATIENT)
Dept: SCHEDULING | Facility: IMAGING CENTER | Age: 22
End: 2022-08-26

## 2022-09-19 SDOH — HEALTH STABILITY: PHYSICAL HEALTH: ON AVERAGE, HOW MANY DAYS PER WEEK DO YOU ENGAGE IN MODERATE TO STRENUOUS EXERCISE (LIKE A BRISK WALK)?: 6 DAYS

## 2022-09-19 SDOH — ECONOMIC STABILITY: FOOD INSECURITY: WITHIN THE PAST 12 MONTHS, THE FOOD YOU BOUGHT JUST DIDN'T LAST AND YOU DIDN'T HAVE MONEY TO GET MORE.: NEVER TRUE

## 2022-09-19 SDOH — ECONOMIC STABILITY: FOOD INSECURITY: WITHIN THE PAST 12 MONTHS, YOU WORRIED THAT YOUR FOOD WOULD RUN OUT BEFORE YOU GOT MONEY TO BUY MORE.: NEVER TRUE

## 2022-09-19 SDOH — ECONOMIC STABILITY: INCOME INSECURITY: IN THE LAST 12 MONTHS, WAS THERE A TIME WHEN YOU WERE NOT ABLE TO PAY THE MORTGAGE OR RENT ON TIME?: NO

## 2022-09-19 SDOH — ECONOMIC STABILITY: TRANSPORTATION INSECURITY
IN THE PAST 12 MONTHS, HAS THE LACK OF TRANSPORTATION KEPT YOU FROM MEDICAL APPOINTMENTS OR FROM GETTING MEDICATIONS?: NO

## 2022-09-19 SDOH — ECONOMIC STABILITY: HOUSING INSECURITY: IN THE LAST 12 MONTHS, HOW MANY PLACES HAVE YOU LIVED?: 2

## 2022-09-19 SDOH — ECONOMIC STABILITY: HOUSING INSECURITY
IN THE LAST 12 MONTHS, WAS THERE A TIME WHEN YOU DID NOT HAVE A STEADY PLACE TO SLEEP OR SLEPT IN A SHELTER (INCLUDING NOW)?: NO

## 2022-09-19 SDOH — HEALTH STABILITY: PHYSICAL HEALTH: ON AVERAGE, HOW MANY MINUTES DO YOU ENGAGE IN EXERCISE AT THIS LEVEL?: 70 MIN

## 2022-09-19 SDOH — ECONOMIC STABILITY: INCOME INSECURITY: HOW HARD IS IT FOR YOU TO PAY FOR THE VERY BASICS LIKE FOOD, HOUSING, MEDICAL CARE, AND HEATING?: SOMEWHAT HARD

## 2022-09-19 SDOH — HEALTH STABILITY: MENTAL HEALTH
STRESS IS WHEN SOMEONE FEELS TENSE, NERVOUS, ANXIOUS, OR CAN'T SLEEP AT NIGHT BECAUSE THEIR MIND IS TROUBLED. HOW STRESSED ARE YOU?: RATHER MUCH

## 2022-09-19 SDOH — ECONOMIC STABILITY: TRANSPORTATION INSECURITY
IN THE PAST 12 MONTHS, HAS LACK OF RELIABLE TRANSPORTATION KEPT YOU FROM MEDICAL APPOINTMENTS, MEETINGS, WORK OR FROM GETTING THINGS NEEDED FOR DAILY LIVING?: NO

## 2022-09-19 SDOH — ECONOMIC STABILITY: TRANSPORTATION INSECURITY
IN THE PAST 12 MONTHS, HAS LACK OF TRANSPORTATION KEPT YOU FROM MEETINGS, WORK, OR FROM GETTING THINGS NEEDED FOR DAILY LIVING?: NO

## 2022-09-19 ASSESSMENT — SOCIAL DETERMINANTS OF HEALTH (SDOH)
DO YOU BELONG TO ANY CLUBS OR ORGANIZATIONS SUCH AS CHURCH GROUPS UNIONS, FRATERNAL OR ATHLETIC GROUPS, OR SCHOOL GROUPS?: NO
HOW OFTEN DO YOU GET TOGETHER WITH FRIENDS OR RELATIVES?: PATIENT DECLINED
ARE YOU MARRIED, WIDOWED, DIVORCED, SEPARATED, NEVER MARRIED, OR LIVING WITH A PARTNER?: LIVING WITH PARTNER
HOW OFTEN DO YOU ATTENT MEETINGS OF THE CLUB OR ORGANIZATION YOU BELONG TO?: NEVER
HOW OFTEN DO YOU GET TOGETHER WITH FRIENDS OR RELATIVES?: PATIENT DECLINED
HOW OFTEN DO YOU ATTENT MEETINGS OF THE CLUB OR ORGANIZATION YOU BELONG TO?: NEVER
HOW HARD IS IT FOR YOU TO PAY FOR THE VERY BASICS LIKE FOOD, HOUSING, MEDICAL CARE, AND HEATING?: SOMEWHAT HARD
IN A TYPICAL WEEK, HOW MANY TIMES DO YOU TALK ON THE PHONE WITH FAMILY, FRIENDS, OR NEIGHBORS?: PATIENT DECLINED
HOW OFTEN DO YOU HAVE A DRINK CONTAINING ALCOHOL: MONTHLY OR LESS
HOW MANY DRINKS CONTAINING ALCOHOL DO YOU HAVE ON A TYPICAL DAY WHEN YOU ARE DRINKING: 1 OR 2
WITHIN THE PAST 12 MONTHS, YOU WORRIED THAT YOUR FOOD WOULD RUN OUT BEFORE YOU GOT THE MONEY TO BUY MORE: NEVER TRUE
HOW OFTEN DO YOU HAVE SIX OR MORE DRINKS ON ONE OCCASION: NEVER
IN A TYPICAL WEEK, HOW MANY TIMES DO YOU TALK ON THE PHONE WITH FAMILY, FRIENDS, OR NEIGHBORS?: PATIENT DECLINED
ARE YOU MARRIED, WIDOWED, DIVORCED, SEPARATED, NEVER MARRIED, OR LIVING WITH A PARTNER?: LIVING WITH PARTNER
DO YOU BELONG TO ANY CLUBS OR ORGANIZATIONS SUCH AS CHURCH GROUPS UNIONS, FRATERNAL OR ATHLETIC GROUPS, OR SCHOOL GROUPS?: NO
HOW OFTEN DO YOU ATTEND CHURCH OR RELIGIOUS SERVICES?: NEVER
HOW OFTEN DO YOU ATTEND CHURCH OR RELIGIOUS SERVICES?: NEVER

## 2022-09-19 ASSESSMENT — LIFESTYLE VARIABLES
HOW OFTEN DO YOU HAVE SIX OR MORE DRINKS ON ONE OCCASION: NEVER
HOW MANY STANDARD DRINKS CONTAINING ALCOHOL DO YOU HAVE ON A TYPICAL DAY: 1 OR 2
SKIP TO QUESTIONS 9-10: 1
AUDIT-C TOTAL SCORE: 1
HOW OFTEN DO YOU HAVE A DRINK CONTAINING ALCOHOL: MONTHLY OR LESS

## 2022-09-20 ENCOUNTER — OFFICE VISIT (OUTPATIENT)
Dept: MEDICAL GROUP | Facility: PHYSICIAN GROUP | Age: 22
End: 2022-09-20
Payer: COMMERCIAL

## 2022-09-20 VITALS
TEMPERATURE: 99 F | HEIGHT: 69 IN | OXYGEN SATURATION: 100 % | WEIGHT: 110 LBS | HEART RATE: 64 BPM | DIASTOLIC BLOOD PRESSURE: 66 MMHG | BODY MASS INDEX: 16.29 KG/M2 | SYSTOLIC BLOOD PRESSURE: 98 MMHG

## 2022-09-20 DIAGNOSIS — Z30.011 ENCOUNTER FOR INITIAL PRESCRIPTION OF CONTRACEPTIVE PILLS: ICD-10-CM

## 2022-09-20 DIAGNOSIS — Z76.89 ENCOUNTER TO ESTABLISH CARE: ICD-10-CM

## 2022-09-20 DIAGNOSIS — F41.9 ANXIETY: ICD-10-CM

## 2022-09-20 PROCEDURE — 99204 OFFICE O/P NEW MOD 45 MIN: CPT | Performed by: NURSE PRACTITIONER

## 2022-09-20 RX ORDER — NORETHINDRONE ACETATE AND ETHINYL ESTRADIOL .02; 1 MG/1; MG/1
TABLET ORAL
COMMUNITY
Start: 2022-07-14 | End: 2022-09-20 | Stop reason: SDUPTHER

## 2022-09-20 RX ORDER — ESCITALOPRAM OXALATE 10 MG/1
10 TABLET ORAL
COMMUNITY
Start: 2022-09-15 | End: 2023-03-21

## 2022-09-20 RX ORDER — ALPRAZOLAM 0.5 MG/1
TABLET ORAL
COMMUNITY
Start: 2022-09-16 | End: 2023-03-21

## 2022-09-20 RX ORDER — NORETHINDRONE ACETATE AND ETHINYL ESTRADIOL .02; 1 MG/1; MG/1
1 TABLET ORAL DAILY
Qty: 63 TABLET | Refills: 5 | Status: SHIPPED | OUTPATIENT
Start: 2022-09-20 | End: 2023-10-17

## 2022-09-20 ASSESSMENT — PATIENT HEALTH QUESTIONNAIRE - PHQ9: CLINICAL INTERPRETATION OF PHQ2 SCORE: 0

## 2022-09-20 ASSESSMENT — ENCOUNTER SYMPTOMS
MUSCULOSKELETAL NEGATIVE: 1
PALPITATIONS: 0
SHORTNESS OF BREATH: 0
INSOMNIA: 0
NEUROLOGICAL NEGATIVE: 1
FEVER: 0
EYES NEGATIVE: 1
COUGH: 0
NERVOUS/ANXIOUS: 1
CONSTITUTIONAL NEGATIVE: 1
GASTROINTESTINAL NEGATIVE: 1
SPUTUM PRODUCTION: 0

## 2022-09-20 ASSESSMENT — FIBROSIS 4 INDEX: FIB4 SCORE: 0.34

## 2022-09-20 NOTE — LETTER
[facility]  Pcp Pt States None  No address on file  Fax: [phone]   Authorization for Release/Disclosure of   Protected Health Information   Name: MARISA SALAS : 2000 SSN: [id number]   Address: [address] [place], [place] [id number]  [place] [address] Phone:    [phone] (home) [phone] (work)   I authorize the entity listed below to release/disclose the PHI below to:   [facility]/Pcp Pt States None and Charley Snow D.N.P.   Provider or Entity Name:  [facility]    Address   City, State, Zip   Phone:      Fax:     Reason for request: continuity of care   Information to be released:    [  ] LAST COLONOSCOPY,  including any PATH REPORT and follow-up  [  ] LAST FIT/COLOGUARD RESULT [  ] LAST DEXA  [  ] LAST MAMMOGRAM  [ xx ] LAST PAP  [ xx ] LAST LABS [  ] RETINA EXAM REPORT  [  ] IMMUNIZATION RECORDS  [  ] Release all info      [  ] Check here and initial the line next to each item to release ALL health information INCLUDING  _____ Care and treatment for drug and / or alcohol abuse  _____ HIV testing, infection status, or AIDS  _____ Genetic Testing    DATES OF SERVICE OR TIME PERIOD TO BE DISCLOSED: _____________  I understand and acknowledge that:  * This Authorization may be revoked at any time by you in writing, except if your health information has already been used or disclosed.  * Your health information that will be used or disclosed as a result of you signing this authorization could be re-disclosed by the recipient. If this occurs, your re-disclosed health information may no longer be protected by State or Federal laws.  * You may refuse to sign this Authorization. Your refusal will not affect your ability to obtain treatment.  * This Authorization becomes effective upon signing and will  on (date) __________.      If no date is indicated, this Authorization will  one (1) year from the signature date.    Name: Marisa Salas    Signature:   Date:     2022        PLEASE FAX REQUESTED RECORDS BACK TO: [phone]

## 2022-09-20 NOTE — PROGRESS NOTES
Subjective:     CC:    Chief Complaint   Patient presents with    Establish Care    Contraception     Refill on BCP         HISTORY OF THE PRESENT ILLNESS: Patient is a 21 y.o. female, here today to establish care. Prior PCP was at [facility]. The below problems were discussed/reviewed at this visit:    Problem   Anxiety    States having symptoms for several years. Started sessions with therapist/counselor about 6 months ago & was diagnosed with anxiety.  She saw virtual psychiatrist about a week ago & was started on Lexapro with xanax. States she has weekly sessions with psychiatrist. She is no longer seeing counselor, states taking mental break from therapy.     Encounter for Initial Prescription of Contraceptive Pills    LMP- end of 8/2022; irregular heavy periods; sexually active with male partner  Has tried ring & other oral birth control in the past (Lo Estrin 1-10, Jayla), continued to have mood swings & heavy, irregular bleeding. She recently consulted online with pill club & started on norethindrone-ethinyl estradiol 1-20 (no placebo). On 3rd week and no bleeding or side effects, mood symptoms have also improved.     First PAP 2021, per pt was abnormal; repeat in 3/2022 came back normal. Will request records         Patient Active Problem List   Diagnosis    Chronic patellofemoral pain of left knee    Anxiety    Encounter for initial prescription of contraceptive pills       History reviewed. No pertinent past medical history.     Current Outpatient Medications Ordered in Epic   Medication Sig Dispense Refill    ALPRAZolam (XANAX) 0.5 MG Tab       escitalopram (LEXAPRO) 10 MG Tab Take 10 mg by mouth every day.      norethindrone-ethinyl estradiol (MICROGESTIN 1/20) 1-20 MG-MCG per tablet Take 1 Tablet by mouth every day. 63 Tablet 5     No current Epic-ordered facility-administered medications on file.        Past Surgical History:   Procedure Laterality Date    KNEE ARTHROSCOPY Left 8/8/2016     "Procedure: ARTHROSCOPY WITH ARTHROSCOPIC LATERAL RELEASE LEFT KNEE;  Surgeon: Kyle Porter M.D.;  Location: SURGERY [facility];  Service:         Allergies:  Patient has no known allergies.    Health Maintenance: Completed  - ; first PAP  abnormal; repeat 3/2022 normal per pt; get records  - on norethindrone-ethinyl estradiol 1-20   - sexually active with male; states recent STD screen was negative    ROS:   Review of Systems   Constitutional: Negative.  Negative for fever and malaise/fatigue.   HENT: Negative.     Eyes: Negative.    Respiratory:  Negative for cough, sputum production and shortness of breath.    Cardiovascular:  Negative for chest pain, palpitations and leg swelling.   Gastrointestinal: Negative.    Genitourinary: Negative.    Musculoskeletal: Negative.    Neurological: Negative.    Endo/Heme/Allergies: Negative.    Psychiatric/Behavioral:  Negative for suicidal ideas. The patient is nervous/anxious. The patient does not have insomnia.         Daily inhaled marijuana use       Objective:     Exam: BP (!) 98/66   Pulse 64   Temp 37.2 °C (99 °F) (Temporal)   Ht 1.753 m (5' 9\")   Wt 49.9 kg (110 lb)   SpO2 100%  Body mass index is 16.24 kg/m².    Physical Exam  Constitutional:       Appearance: Normal appearance.   Cardiovascular:      Rate and Rhythm: Normal rate and regular rhythm.      Pulses: Normal pulses.      Heart sounds: Normal heart sounds.   Pulmonary:      Effort: Pulmonary effort is normal.      Breath sounds: Normal breath sounds.   Musculoskeletal:         General: Normal range of motion.      Cervical back: Normal range of motion and neck supple.   Skin:     General: Skin is warm and dry.   Neurological:      General: No focal deficit present.      Mental Status: She is alert and oriented to person, place, and time.   Psychiatric:         Mood and Affect: Mood normal.         Behavior: Behavior normal.         Thought Content: Thought content normal.         Judgment: " Judgment normal.     Assessment & Plan:   21 y.o. female with the following -    Problem List Items Addressed This Visit       Anxiety    Relevant Medications    ALPRAZolam (XANAX) 0.5 MG Tab    escitalopram (LEXAPRO) 10 MG Tab    Encounter for initial prescription of contraceptive pills     Menorrhagia, mood swings managed on current birth control  - continue norethindrone-ethinyl estradiol 1-20; she does not wish to have a period so will stay on continuous dosing. She will let me know if spotting or break through bleeding after 3-6 months & plan will be to have a week off pills.         Relevant Medications    norethindrone-ethinyl estradiol (MICROGESTIN 1/20) 1-20 MG-MCG per tablet     Other Visit Diagnoses       Encounter to establish care                Medications Prescribed Today:  1. Encounter for initial prescription of contraceptive pills  - norethindrone-ethinyl estradiol (MICROGESTIN 1/20) 1-20 MG-MCG per tablet; Take 1 Tablet by mouth every day.  Dispense: 63 Tablet; Refill: 5    Educated in proper administration of medication(s) ordered today including safety, possible SE, risks, benefits, rationale and alternatives to therapy.     Return in about 6 months (around 3/20/2023) for Annual Visit.    Please note that this dictation was created using voice recognition software. I have made every reasonable attempt to correct obvious errors, but I expect that there are errors of grammar and possibly content that I did not discover before finalizing the note.

## 2022-09-21 NOTE — ASSESSMENT & PLAN NOTE
Menorrhagia, mood swings managed on current birth control  - continue norethindrone-ethinyl estradiol 1-20; she does not wish to have a period so will stay on continuous dosing. She will let me know if spotting or break through bleeding after 3-6 months & plan will be to have a week off pills.

## 2023-03-21 ENCOUNTER — OFFICE VISIT (OUTPATIENT)
Dept: MEDICAL GROUP | Facility: PHYSICIAN GROUP | Age: 23
End: 2023-03-21
Payer: COMMERCIAL

## 2023-03-21 VITALS
TEMPERATURE: 97.3 F | DIASTOLIC BLOOD PRESSURE: 80 MMHG | HEIGHT: 69 IN | SYSTOLIC BLOOD PRESSURE: 102 MMHG | WEIGHT: 112 LBS | HEART RATE: 88 BPM | BODY MASS INDEX: 16.59 KG/M2 | OXYGEN SATURATION: 98 %

## 2023-03-21 DIAGNOSIS — F41.9 ANXIETY: ICD-10-CM

## 2023-03-21 DIAGNOSIS — K21.9 GASTROESOPHAGEAL REFLUX DISEASE, UNSPECIFIED WHETHER ESOPHAGITIS PRESENT: ICD-10-CM

## 2023-03-21 DIAGNOSIS — Z00.00 ENCOUNTER FOR PREVENTATIVE ADULT HEALTH CARE EXAMINATION: ICD-10-CM

## 2023-03-21 DIAGNOSIS — Z78.9 USES ORAL CONTRACEPTIVES AS PRIMARY BIRTH CONTROL METHOD: ICD-10-CM

## 2023-03-21 DIAGNOSIS — Z30.011 ENCOUNTER FOR INITIAL PRESCRIPTION OF CONTRACEPTIVE PILLS: ICD-10-CM

## 2023-03-21 PROCEDURE — 99395 PREV VISIT EST AGE 18-39: CPT | Performed by: NURSE PRACTITIONER

## 2023-03-21 RX ORDER — CLONAZEPAM 1 MG/1
TABLET ORAL
COMMUNITY
Start: 2022-12-30

## 2023-03-21 RX ORDER — CLONAZEPAM 0.5 MG/1
TABLET ORAL
COMMUNITY
Start: 2023-03-20

## 2023-03-21 RX ORDER — FLUOXETINE HYDROCHLORIDE 20 MG/1
20 CAPSULE ORAL
COMMUNITY
Start: 2023-01-10

## 2023-03-21 RX ORDER — OMEPRAZOLE 20 MG/1
20 CAPSULE, DELAYED RELEASE ORAL DAILY
Qty: 30 CAPSULE | Refills: 0 | Status: SHIPPED | OUTPATIENT
Start: 2023-03-21 | End: 2023-04-18 | Stop reason: SDUPTHER

## 2023-03-21 RX ORDER — FLUOXETINE HYDROCHLORIDE 20 MG/1
CAPSULE ORAL
COMMUNITY
Start: 2022-12-30

## 2023-03-21 ASSESSMENT — FIBROSIS 4 INDEX: FIB4 SCORE: 0.36

## 2023-03-21 ASSESSMENT — PATIENT HEALTH QUESTIONNAIRE - PHQ9: CLINICAL INTERPRETATION OF PHQ2 SCORE: 0

## 2023-03-21 NOTE — ASSESSMENT & PLAN NOTE
Menorrhagia, mood swings managed on current birth control  - continue norethindrone-ethinyl estradiol 1-20; she does not wish to have a period so will stay on continuous dosing. She denies any spotting or break through bleeding while on continuous BCP.

## 2023-03-21 NOTE — PROGRESS NOTES
CC:Diagnoses of Anxiety, Gastroesophageal reflux disease, unspecified whether esophagitis present, Uses oral contraceptives as primary birth control method, Encounter for preventative adult health care examination, and Encounter for initial prescription of contraceptive pills were pertinent to this visit.    Pilar Garcia is a 22 y.o. female here for a routine preventive health exam. Active concern today is indigestion with heart burn, increased burping the past month. She is otherwise doing okay.    Health Maintenance: Completed    Ob-Gyn/ History:    /Para:  0/0  Last Pap Smear:  first PAP  abnormal; repeat 3/2022 normal per pt; get records  Gyn Surgery:  none.  Current Contraceptive Method:  norethindrone-ethinyl estradiol 1-20, no placebo . She is currently sexually active with male partner.  Last menstrual period:  Amenorrhea since 2022 when she starting continuous BCP.    Post-menopausal bleeding: n/a  Urinary incontinence: denies  Folate intake: no     Screening/Preventative Topics:  Advanced directive: n/a   Osteoporosis Screen/ DEXA: n/a; start age 65   Diabetes Screening: n/a; low risk  AAA Screening: n/a  Aspirin Use: no    Diet: encouraged heart healthy eating with adequate fiber, nutritious ingredients. Avoid processed carbs and sugar added foods.    Exercise: Encouraged regular exercise 3-5x/week   Screen for depression: PHQ-2: seeing psychiatrist & therapist; on  daily prozac with prn klonopin   Substance Abuse: denies  Safe in relationship   Sun protection used.    Cancer screening  Colorectal Cancer Screening: n/a; start age 45    Lung Cancer Screening: n/a; non smoker    Cervical Cancer Screening: 3/2022; get records   Breast Cancer Screening: n/a; start age 50     Infectious disease screening  --STI Screening: declined   --Practices safe sex.  --HIV Screening: declined   --Syphilis Screening: declined  --Hepatitis C Screening: declined     Patient Active Problem List     "Diagnosis Date Noted    Anxiety 09/20/2022    Encounter for initial prescription of contraceptive pills 09/20/2022    Chronic patellofemoral pain of left knee 06/29/2016      Allergies:Patient has no known allergies.    Current Outpatient Medications   Medication Sig Dispense Refill    FLUoxetine (PROZAC) 20 MG Cap       clonazePAM (KLONOPIN) 1 MG Tab       omeprazole (PRILOSEC) 20 MG delayed-release capsule Take 1 Capsule by mouth every day. 30 Capsule 0    norethindrone-ethinyl estradiol (MICROGESTIN 1/20) 1-20 MG-MCG per tablet Take 1 Tablet by mouth every day. 63 Tablet 5    FLUoxetine (PROZAC) 20 MG Cap Take 20 mg by mouth every day.      ALPRAZolam (XANAX) 0.5 MG Tab       escitalopram (LEXAPRO) 10 MG Tab Take 10 mg by mouth every day.       No current facility-administered medications for this visit.       Social History     Tobacco Use    Smoking status: Never    Smokeless tobacco: Never   Vaping Use    Vaping Use: Every day    Substances: CBD    Devices: Pre-filled or refillable cartridge   Substance Use Topics    Alcohol use: Yes     Comment: 2x / year    Drug use: Yes     Types: Marijuana     Social History     Social History Narrative    Not on file       Family History   Problem Relation Age of Onset    Breast Cancer Maternal Aunt     Stomach Cancer Maternal Aunt     Diabetes Maternal Grandmother        Review of Systems:    Constitutional: No Fevers, Chills  Eyes: No vision changes  ENT: No hearing changes  Resp: No Shortness of breath  CV: No Chest pain  GI: No Nausea/Vomiting  MSK: No weakness  Skin: No rashes  Neuro: No Headaches  Psych: No Suicidal ideations    All remaining systems reviewed and found to be negative, except as stated above.    Exam:    /80   Pulse 88   Temp 36.3 °C (97.3 °F)   Ht 1.753 m (5' 9\")   Wt 50.8 kg (112 lb)   SpO2 98%  Body mass index is 16.54 kg/m².    General:  Well nourished, well developed female in NAD  HENT: Atraumatic, normocephalic  EYES: Extraocular " movements intact, pupils equal and reactive to light  NECK: Supple, FROM  CHEST: No deformities, Equal chest expansion  RESP: Unlabored, no stridor or audible wheeze  ABD: Soft, Nontender, Non-Distended  : deferred to GYN  Extremities: No Clubbing, Cyanosis, or Edema  Skin: Warm/dry, without rashes  Neuro: A/O x 4, CN 2-12 Grossly intact, Motor/sensory grossly intact  Psych: Normal behavior, normal affect    LABS: Results reviewed and discussed with the patient, questions answered.      Assessment/Plan:  1. Anxiety  Continues with psychiatry and back in weekly therapy sessions. Medications have changed since last visit.   Now on Prozac 20mg QD with as needed klonopin (was on Lexapro, xanax last year)    2. Gastroesophageal reflux disease, unspecified whether esophagitis present  Reports the past few weeks having increased indigestion with heart burn and burping. No abdominal pain or blood stools or emesis. She is constipated on some days. Normal abdominal exam today  - adequate water intake daily, increase fiber intake with fresh fruits, vegetables; can add fiber supplement if needed  - avoid spicy/greasy foods; no late night eating close to bedtime  - trial with PPI; take on empty stomach at least 30 mins before meal.  - omeprazole (PRILOSEC) 20 MG delayed-release capsule; Take 1 Capsule by mouth every day.  Dispense: 30 Capsule; Refill: 0    3. Uses oral contraceptives as primary birth control method  Menorrhagia, mood swings managed on current birth control  - continue norethindrone-ethinyl estradiol 1-20; she does not wish to have a period so will stay on continuous dosing. She denies any spotting or break through bleeding while on continuous BCP.    4. Encounter for preventative adult health care examination  Patient up-to-date on preventative health maintenance examinations and vaccinations.  Age-appropriate anticipatory guidance provided today. Patient counseled about safe sex, self breast exam,  supplements, diet and exercise.     Return in about 4 weeks (around 4/18/2023) for gerd mgt.    Preventive visit in 1 year, sooner as needed for any concerns.     Please note that this dictation was created using voice recognition software. I have worked with consultants from the vendor as well as technical experts from [employer] [employer] to optimize the interface. I have made every reasonable attempt to correct obvious errors, but I expect that there are errors of grammar and possibly content that I did not discover before finalizing the note.

## 2023-03-21 NOTE — LETTER
[employer]  LORNA LutzP.  [address]  [place] [address]  Fax: [phone]   Authorization for Release/Disclosure of   Protected Health Information   Name: PILAR SALAS : 2000 SSN: [id number]   Address: [address] [place] [address] [address]  [place] [address] Phone:    [phone] (home) [phone] (work)   I authorize the entity listed below to release/disclose the PHI below to:   [facility]/Charley Snow D.N.P. and Charley Snow D.N.P.   Provider or Entity Name:  [facility]    Address   City, State, [facility]   Phone:      Fax:     Reason for request: continuity of care   Information to be released:    [  ] LAST COLONOSCOPY,  including any PATH REPORT and follow-up  [  ] LAST FIT/COLOGUARD RESULT [  ] LAST DEXA  [  ] LAST MAMMOGRAM  [ xx ] LAST PAP  [ xx ] LAST LABS [  ] RETINA EXAM REPORT  [  ] IMMUNIZATION RECORDS  [ xx ] Release all info      [  ] Check here and initial the line next to each item to release ALL health information INCLUDING  _____ Care and treatment for drug and / or alcohol abuse  _____ HIV testing, infection status, or AIDS  _____ Genetic Testing    DATES OF SERVICE OR TIME PERIOD TO BE DISCLOSED: _____________  I understand and acknowledge that:  * This Authorization may be revoked at any time by you in writing, except if your health information has already been used or disclosed.  * Your health information that will be used or disclosed as a result of you signing this authorization could be re-disclosed by the recipient. If this occurs, your re-disclosed health information may no longer be protected by State or Federal laws.  * You may refuse to sign this Authorization. Your refusal will not affect your ability to obtain treatment.  * This Authorization becomes effective upon signing and will  on (date) __________.      If no date is indicated, this Authorization will  one (1) year from the signature date.    Name: Pilar Salas  Signature: Date:    3/21/2023     PLEASE FAX REQUESTED RECORDS BACK TO: [phone]

## 2023-03-21 NOTE — ASSESSMENT & PLAN NOTE
Reports the past few weeks having increased indigestion with heart burn and burping. No abdominal pain or blood stools or emesis. She is constipated on some days. Normal abdominal exam today  - adequate water intake daily, increase fiber intake with fresh fruits, vegetables; can add fiber supplement if needed  - avoid spicy/greasy foods; no late night eating close to bedtime  - trial with PPI; take on empty stomach at least 30 mins before meal.

## 2023-04-18 ENCOUNTER — HOSPITAL ENCOUNTER (OUTPATIENT)
Dept: LAB | Facility: MEDICAL CENTER | Age: 23
End: 2023-04-18
Attending: NURSE PRACTITIONER
Payer: COMMERCIAL

## 2023-04-18 ENCOUNTER — HOSPITAL ENCOUNTER (OUTPATIENT)
Facility: MEDICAL CENTER | Age: 23
End: 2023-04-18
Attending: NURSE PRACTITIONER
Payer: COMMERCIAL

## 2023-04-18 ENCOUNTER — OFFICE VISIT (OUTPATIENT)
Dept: MEDICAL GROUP | Facility: PHYSICIAN GROUP | Age: 23
End: 2023-04-18
Payer: COMMERCIAL

## 2023-04-18 VITALS
HEIGHT: 69 IN | OXYGEN SATURATION: 98 % | HEART RATE: 68 BPM | SYSTOLIC BLOOD PRESSURE: 112 MMHG | WEIGHT: 116 LBS | BODY MASS INDEX: 17.18 KG/M2 | TEMPERATURE: 98 F | DIASTOLIC BLOOD PRESSURE: 68 MMHG

## 2023-04-18 DIAGNOSIS — Z11.3 ROUTINE SCREENING FOR STI (SEXUALLY TRANSMITTED INFECTION): ICD-10-CM

## 2023-04-18 DIAGNOSIS — K21.9 GASTROESOPHAGEAL REFLUX DISEASE, UNSPECIFIED WHETHER ESOPHAGITIS PRESENT: ICD-10-CM

## 2023-04-18 LAB
HIV 1+2 AB+HIV1 P24 AG SERPL QL IA: NORMAL
T PALLIDUM AB SER QL IA: NORMAL

## 2023-04-18 PROCEDURE — 87591 N.GONORRHOEAE DNA AMP PROB: CPT

## 2023-04-18 PROCEDURE — 99214 OFFICE O/P EST MOD 30 MIN: CPT | Performed by: NURSE PRACTITIONER

## 2023-04-18 PROCEDURE — 87389 HIV-1 AG W/HIV-1&-2 AB AG IA: CPT

## 2023-04-18 PROCEDURE — 36415 COLL VENOUS BLD VENIPUNCTURE: CPT

## 2023-04-18 PROCEDURE — 87491 CHLMYD TRACH DNA AMP PROBE: CPT

## 2023-04-18 PROCEDURE — 86780 TREPONEMA PALLIDUM: CPT

## 2023-04-18 RX ORDER — OMEPRAZOLE 20 MG/1
20 CAPSULE, DELAYED RELEASE ORAL DAILY
Qty: 30 CAPSULE | Refills: 2 | Status: SHIPPED | OUTPATIENT
Start: 2023-04-18 | End: 2023-07-26

## 2023-04-18 ASSESSMENT — ENCOUNTER SYMPTOMS
CONSTITUTIONAL NEGATIVE: 1
SHORTNESS OF BREATH: 0
PSYCHIATRIC NEGATIVE: 1
FEVER: 0
PALPITATIONS: 0
SPUTUM PRODUCTION: 0
NEUROLOGICAL NEGATIVE: 1
GASTROINTESTINAL NEGATIVE: 1
EYES NEGATIVE: 1
MUSCULOSKELETAL NEGATIVE: 1
COUGH: 0

## 2023-04-18 ASSESSMENT — FIBROSIS 4 INDEX: FIB4 SCORE: 0.36

## 2023-04-18 NOTE — ASSESSMENT & PLAN NOTE
Reported increased indigestion with heart burn and burping at last visit 3/21/2023. No abdominal pain or blood stools or emesis. She is constipated on some days. Normal abdominal exam at the time and today. She has improvement with trial of omeprazole from last visit, takes on her work days when symptoms are worse. Could be anxiety related and she is seeing psychiatry for this  - encouraged adequate water intake daily, increase fiber intake with fresh fruits, vegetables; can add fiber supplement if needed  - avoid spicy/greasy foods; no late night eating close to bedtime  - can continue Omeprazole 20mg QD, PPI; take on empty stomach at least 30 mins before meal. Taper down as symptoms improve

## 2023-04-18 NOTE — PROGRESS NOTES
Subjective       CC:   Chief Complaint   Patient presents with    Gastrophageal Reflux     Follow up         HPI:   Patient is a 22 y.o. established female patient with medical history listed below here today for evaluation and management of GERD. We started her on omeprazole at our last visit in 3/2023. States symptoms much improved. She is taking about 4-5/week, mostly on her work days when her symptoms are worse.   She recently broke up with her boyfriend and would like to get screened for STD. Denies any symptoms today.     I received PAP results from outside clinic  - 6/14/2021- LSIL, negative GC/chlamydia  - recall done 4/8/2022- NILM; repeat in 3 years    Patient Active Problem List   Diagnosis    Chronic patellofemoral pain of left knee    Anxiety    Encounter for initial prescription of contraceptive pills    Gastroesophageal reflux disease       No past medical history on file.     Past Surgical History:   Procedure Laterality Date    KNEE ARTHROSCOPY Left 8/8/2016    Procedure: ARTHROSCOPY WITH ARTHROSCOPIC LATERAL RELEASE LEFT KNEE;  Surgeon: Kyle Porter M.D.;  Location: SURGERY [facility];  Service:         Current Outpatient Medications on File Prior to Visit   Medication Sig Dispense Refill    FLUoxetine (PROZAC) 20 MG Cap       FLUoxetine (PROZAC) 20 MG Cap Take 20 mg by mouth every day.      clonazePAM (KLONOPIN) 1 MG Tab       clonazePAM (KLONOPIN) 0.5 MG Tab       norethindrone-ethinyl estradiol (MICROGESTIN 1/20) 1-20 MG-MCG per tablet Take 1 Tablet by mouth every day. 63 Tablet 5     No current facility-administered medications on file prior to visit.        Health Maintenance: Completed    ROS:  Review of Systems   Constitutional: Negative.  Negative for fever and malaise/fatigue.   HENT: Negative.     Eyes: Negative.    Respiratory:  Negative for cough, sputum production and shortness of breath.    Cardiovascular:  Negative for chest pain, palpitations and leg swelling.  "  Gastrointestinal: Negative.    Genitourinary: Negative.    Musculoskeletal: Negative.    Neurological: Negative.    Endo/Heme/Allergies: Negative.    Psychiatric/Behavioral: Negative.       Objective       Exam:  /68   Pulse 68   Temp 36.7 °C (98 °F) (Temporal)   Ht 1.753 m (5' 9\")   Wt 52.6 kg (116 lb)   SpO2 98%   BMI 17.13 kg/m²  Body mass index is 17.13 kg/m².    Physical Exam  Constitutional:       Appearance: Normal appearance.   Cardiovascular:      Rate and Rhythm: Normal rate and regular rhythm.      Pulses: Normal pulses.      Heart sounds: Normal heart sounds.   Pulmonary:      Effort: Pulmonary effort is normal.      Breath sounds: Normal breath sounds.   Abdominal:      General: Abdomen is flat. Bowel sounds are normal.      Palpations: Abdomen is soft.   Skin:     General: Skin is warm and dry.   Neurological:      Mental Status: She is alert.       Assessment & Plan       22 y.o. female with the following -     Problem List Items Addressed This Visit       Gastroesophageal reflux disease     Reported increased indigestion with heart burn and burping at last visit 3/21/2023. No abdominal pain or blood stools or emesis. She is constipated on some days. Normal abdominal exam at the time and today. She has improvement with trial of omeprazole from last visit, takes on her work days when symptoms are worse. Could be anxiety related and she is seeing psychiatry for this  - encouraged adequate water intake daily, increase fiber intake with fresh fruits, vegetables; can add fiber supplement if needed  - avoid spicy/greasy foods; no late night eating close to bedtime  - can continue Omeprazole 20mg QD, PPI; take on empty stomach at least 30 mins before meal. Taper down as symptoms improve         Relevant Medications    omeprazole (PRILOSEC) 20 MG delayed-release capsule     Other Visit Diagnoses       Routine screening for STI (sexually transmitted infection)        Relevant Orders    " Chlamydia/GC, PCR (Urine)    HIV AG/AB COMBO ASSAY SCREENING    T.PALLIDUM AB YORDY (SCREENING)          Educated in proper administration of medication(s) ordered today including safety, possible SE, risks, benefits, rationale and alternatives to therapy.     Return in about 3 months (around 7/18/2023) for GERD mgt.    Please note that this dictation was created using voice recognition software. I have made every reasonable attempt to correct obvious errors, but I expect that there are errors of grammar and possibly content that I did not discover before finalizing the note.

## 2023-04-19 DIAGNOSIS — Z11.3 ROUTINE SCREENING FOR STI (SEXUALLY TRANSMITTED INFECTION): ICD-10-CM

## 2023-04-19 LAB
C TRACH DNA SPEC QL NAA+PROBE: NEGATIVE
N GONORRHOEA DNA SPEC QL NAA+PROBE: NEGATIVE
SPECIMEN SOURCE: NORMAL

## 2023-05-02 ENCOUNTER — TELEPHONE (OUTPATIENT)
Dept: MEDICAL GROUP | Facility: PHYSICIAN GROUP | Age: 23
End: 2023-05-02
Payer: COMMERCIAL

## 2023-05-02 NOTE — TELEPHONE ENCOUNTER
Phone Number Called: [phone] (home) [phone] (work)      Call outcome: Spoke to patient regarding message below.    Message: Advised patient that she has a test result to view. Pt stated she would log on to [employer] and review her labs.

## 2023-05-30 ENCOUNTER — HOSPITAL ENCOUNTER (OUTPATIENT)
Dept: RADIOLOGY | Facility: MEDICAL CENTER | Age: 23
End: 2023-05-30
Attending: PHYSICIAN ASSISTANT
Payer: COMMERCIAL

## 2023-05-30 ENCOUNTER — OFFICE VISIT (OUTPATIENT)
Dept: URGENT CARE | Facility: PHYSICIAN GROUP | Age: 23
End: 2023-05-30
Payer: COMMERCIAL

## 2023-05-30 VITALS
OXYGEN SATURATION: 99 % | BODY MASS INDEX: 16.44 KG/M2 | HEART RATE: 99 BPM | RESPIRATION RATE: 18 BRPM | TEMPERATURE: 98.6 F | DIASTOLIC BLOOD PRESSURE: 58 MMHG | SYSTOLIC BLOOD PRESSURE: 116 MMHG | WEIGHT: 111 LBS | HEIGHT: 69 IN

## 2023-05-30 DIAGNOSIS — S62.636A CLOSED DISPLACED FRACTURE OF DISTAL PHALANX OF RIGHT LITTLE FINGER, INITIAL ENCOUNTER: ICD-10-CM

## 2023-05-30 DIAGNOSIS — S69.91XA FINGER INJURY, RIGHT, INITIAL ENCOUNTER: ICD-10-CM

## 2023-05-30 PROCEDURE — 73140 X-RAY EXAM OF FINGER(S): CPT | Mod: RT

## 2023-05-30 PROCEDURE — 99213 OFFICE O/P EST LOW 20 MIN: CPT | Performed by: PHYSICIAN ASSISTANT

## 2023-05-30 PROCEDURE — 3074F SYST BP LT 130 MM HG: CPT | Performed by: PHYSICIAN ASSISTANT

## 2023-05-30 PROCEDURE — 3078F DIAST BP <80 MM HG: CPT | Performed by: PHYSICIAN ASSISTANT

## 2023-05-30 ASSESSMENT — ENCOUNTER SYMPTOMS
ARTHRALGIAS: 1
NUMBNESS: 0
JOINT SWELLING: 1

## 2023-05-30 ASSESSMENT — FIBROSIS 4 INDEX: FIB4 SCORE: 0.36

## 2023-05-30 NOTE — PROGRESS NOTES
"Subjective     Pilar Garcia is a 22 y.o. female who presents with Hand Injury (Yesterday was running down stairs catching Rt pinky finger in railing. Pain, red, swelling)            Patient presents with:  Hand Injury: Yesterday was running down stairs catching Rt pinky finger in railing. Painful, red, swelling and bruising. PT denies previous injury to this finger.  Patient is right-handed.  No other complaints of injury.  Patient has been using some ice and over-the-counter ibuprofen with little change in her symptoms.  No other complaints.        Hand Injury  This is a new problem. The current episode started yesterday. The problem occurs constantly. The problem has been gradually worsening. Associated symptoms include arthralgias and joint swelling. Pertinent negatives include no numbness. The symptoms are aggravated by bending and exertion. She has tried ice, NSAIDs and rest for the symptoms. The treatment provided mild relief.       Review of Systems   Musculoskeletal:  Positive for arthralgias and joint swelling.   Neurological:  Negative for numbness.   All other systems reviewed and are negative.             Objective     /58 (BP Location: Left arm, Patient Position: Sitting, BP Cuff Size: Adult)   Pulse 99   Temp 37 °C (98.6 °F) (Temporal)   Resp 18   Ht 1.753 m (5' 9\")   Wt 50.3 kg (111 lb)   SpO2 99%   BMI 16.39 kg/m²      Physical Exam  Vitals and nursing note reviewed.   Constitutional:       General: She is not in acute distress.     Appearance: Normal appearance. She is well-developed. She is not diaphoretic.   HENT:      Head: Normocephalic and atraumatic.      Nose: Nose normal.      Mouth/Throat:      Lips: Pink.      Mouth: Mucous membranes are moist.   Eyes:      Extraocular Movements: Extraocular movements intact.      Conjunctiva/sclera: Conjunctivae normal.      Pupils: Pupils are equal, round, and reactive to light.   Cardiovascular:      Rate and Rhythm: Normal rate and " regular rhythm.      Pulses: Normal pulses.      Heart sounds: Normal heart sounds.   Pulmonary:      Effort: Pulmonary effort is normal.   Abdominal:      General: Abdomen is flat.   Musculoskeletal:      Right hand: Swelling, tenderness and bony tenderness present. Decreased range of motion. Normal sensation. There is no disruption of two-point discrimination. Normal capillary refill. Normal pulse.      Left hand: Normal.        Hands:       Cervical back: Normal range of motion and neck supple.   Skin:     General: Skin is warm and dry.      Capillary Refill: Capillary refill takes less than 2 seconds.   Neurological:      General: No focal deficit present.      Mental Status: She is alert and oriented to person, place, and time.   Psychiatric:         Mood and Affect: Mood normal.                             Assessment & Plan                1. Closed displaced fracture of distal phalanx of right little finger, initial encounter  DX-FINGER(S) 2+ RIGHT    Referral to Orthopedics      2. Finger injury, right, initial encounter  DX-FINGER(S) 2+ RIGHT    Referral to Orthopedics          RADIOLOGY RESULTS   DX-FINGER(S) 2+ RIGHT    Result Date: 5/30/2023 5/30/2023 9:16 AM HISTORY/REASON FOR EXAM:  Pain/Deformity Following Trauma. TECHNIQUE/EXAM DESCRIPTION AND NUMBER OF VIEWS:  3 views of the RIGHT fingers. COMPARISON: None FINDINGS: There is an acute fifth distal phalanx transverse fracture just distal to the articular surface. There is minimal displacement. No subluxation. No other fracture and there is no foreign body     Extra-articular fifth distal phalanx mildly displaced fracture           Xray positive for fracture, finger splint placed for support.     RICE TREATMENT FOR EXTREMITY INJURIES:  R-rest the extremity as much as possible while pain and swelling persist  I-ice the extremity 15 minutes every 2 hours for the first 24 hours, then 4-5 times daily   C-compress the extremity either with splint or ace  wrap as directed  E-elevate the extremity to help with swelling    Pt instructed to keep splint in place until follow up with ortho.     Differential diagnosis, supportive care, and indications for immediate follow-up discussed with patient.  Instructed to return to clinic or nearest emergency department for any change in condition, further concerns, or worsening of symptoms.    I personally reviewed prior external notes and test results pertinent to today's visit.  I have independently reviewed and interpreted all diagnostics ordered during this urgent care visit.    PT should follow up with PCP in 1-2 days for re-evaluation if symptoms have not improved.      Discussed red flags and reasons to return to UC or ED.      Pt and/or family verbalized understanding of diagnosis and follow up instructions and was offered informational handout on diagnosis.  PT discharged.     Please note that this dictation was created using voice recognition software. I have made every reasonable attempt to correct obvious errors, but I expect that there may be errors of grammar and possibly content that I did not discover before finalizing the note.

## 2023-05-30 NOTE — LETTER
May 30, 2023         Patient: Pilar Garcia   YOB: 2000   Date of Visit: 5/30/2023           To Whom it May Concern:    Pilar Garcia was seen in my clinic on 5/30/2023. She may return to work on 5/31/23.    If you have any questions or concerns, please don't hesitate to call.        Sincerely,           Patricia Sarmiento P.A.-C.  Electronically Signed

## 2023-06-26 ENCOUNTER — OCCUPATIONAL THERAPY (OUTPATIENT)
Dept: OCCUPATIONAL THERAPY | Facility: REHABILITATION | Age: 23
End: 2023-06-26
Payer: COMMERCIAL

## 2023-06-26 DIAGNOSIS — S62.666A CLOSED NONDISPLACED FRACTURE OF DISTAL PHALANX OF RIGHT LITTLE FINGER, INITIAL ENCOUNTER: ICD-10-CM

## 2023-06-26 PROCEDURE — 97110 THERAPEUTIC EXERCISES: CPT

## 2023-06-26 PROCEDURE — 97165 OT EVAL LOW COMPLEX 30 MIN: CPT

## 2023-06-26 SDOH — ECONOMIC STABILITY: GENERAL: QUALITY OF LIFE: EXCELLENT

## 2023-06-26 ASSESSMENT — ENCOUNTER SYMPTOMS
PAIN SCALE AT LOWEST: 0
PAIN TIMING: INTERMITTENT
QUALITY: ACHING
PAIN SCALE AT HIGHEST: 3
PAIN SCALE: 0
ALLEVIATING FACTORS: REST
QUALITY: STABBING
EXACERBATED BY: ACTIVITY
QUALITY: SHARP

## 2023-06-26 NOTE — OP THERAPY EVALUATION
Outpatient Occupational Therapy  HAND THERAPY INITIAL EVALUATION    [facility] Occupational Therapy [address]  [address]  [place] [address]  Phone:  [phone]  Fax:  [phone]    Date of Evaluation: 06/26/2023    Patient: Pilar Gracia  YOB: 2000  MRN: [id number]     Referring Provider: Mariana Cordero P.A.-C.  [address] [place] [address]   Referring Diagnosis Nondisplaced fracture of distal phalanx of right little finger, initial encounter for closed fracture [S62.666A]     Time Calculation    Start time: 0845  Stop time: 0930 Time Calculation (min): 45 minutes             Chief Complaint: Extremity Fracture, Self Care Duties, and Hand Weakness    Visit Diagnoses     ICD-10-CM   1. Closed nondisplaced fracture of distal phalanx of right little finger, initial encounter  S62.666A       Subjective:   History of Present Illness:     Date of onset:  5/29/2023    Mechanism of injury:  As per ANK[facility] visit on 6/20/23:  History of Present Illness:  Pilar Garcia is a 22 y.o. female who presents with complaints of injury to right pinky finger occurring 5/29/2023.  This started after patient was running downstairs and caught her pinky finger in the railing.  She was seen at urgent care initially and diagnosed with a right little finger fracture and referred to us for orthopedic follow-up.      Patient today currently complains of pain rated 1 out of 10 in severity.  The pain is described as dull achy.  The pain is made worse by palpation of the area and made better by rest and immobilization.  She tells me her pain has significantly improved since her initial injury but she has bumped it quite a few times since then and that seems to increase her pain as well.  Mild intermittent swelling and light bruising reported.  No other injuries.  Denies fever/chills at home.  Denies numbness/tingling in the extremity. Has no other questions or concerns.  Quality of  life:  Excellent  Prior level of function:  Independent and works full time with [employer].  Headaches:  no headaches  Ear problems: none  Sleep disturbance:  Not disrupted  Pain:     Current pain ratin    At best pain ratin    At worst pain rating:  3    Location:  Right distal aspect of small finger    Quality:  Aching, stabbing and sharp    Pain timing:  Intermittent    Relieving factors:  Rest    Aggravating factors:  Activity    Progression:  Improving  Hand dominance:  Right  Diagnostic Tests:     X-ray: abnormal    Treatments:     Previous treatment:  Immobilization    Current treatment:  Occupational therapy  Patient Goals:     Patient goals for therapy:  Decreased pain, increased motion, increased strength and independence with ADLs/IADLs      No past medical history on file.  Past Surgical History:   Procedure Laterality Date    KNEE ARTHROSCOPY Left 2016    Procedure: ARTHROSCOPY WITH ARTHROSCOPIC LATERAL RELEASE LEFT KNEE;  Surgeon: Kyle Porter M.D.;  Location: SURGERY [facility];  Service:      Social History     Tobacco Use    Smoking status: Never    Smokeless tobacco: Never   Vaping Use    Vaping Use: Every day    Substances: CBD    Devices: Pre-filled or refillable cartridge   Substance Use Topics    Alcohol use: Not Currently     Comment: 2x / year     Family and Occupational History     Socioeconomic History    Marital status: Single     Spouse name: Not on file    Number of children: Not on file    Years of education: Not on file    Highest education level: GED or equivalent   Occupational History    Not on file       Objective     Neurological Testing   Sensation   Wrist/Hand     Right   Diminished: light touch, sharp/dull discrimination and hot/cold discrimination      Active Range of Motion     Right Digits     Little finger       MCP (extension/flexion): 0 / 90       PIP (extension/flexion): 15 / 75       DIP (extension/flexion): 0 / 15    Passive Range of Motion     Right  Digits     Little finger        MCP (extension/flexion): 0 / 90       PIP (extension/flexion): 5 / 95       DIP (extension/flexion): 0 / 32    Strength     Left Wrist/Hand      (2nd hand position)     Trial 1: 55    Right Wrist/Hand      (2nd hand position)     Trial 1: 45    Additional Strength Details  2/5 adductor strength between right 4th and 5th digits.        Therapeutic Exercises (CPT 69191):     1. adduction strengthening of digits    2. AROM and AAROM of right small finger    3. Isolated FDP and FDS strengthening    Therapeutic Exercise Summary:  Initiated HEP and to complete 3 x a day  Patient would benefit with a Stax splint to protect distal end/DIP of digit while healing and during heavier work related tasks.        Time-based treatments/modalities:  Occupational Therapy Timed Treatment Charges  Therapeutic exercise minutes (CPT 63849): 15 minutes      Assessment and Plan:   Problem list/assessment: abnormal or restricted ROM, decreased HEP knowledge, decreased sensation, decreased strength and pain    Assessment details:  Patient is a 21 y/o female s/p fracture of right distal area of small finger on 5/29/23.  Patient presenting with limited ROM, decreased sensation at distal end of digit and decreased strength. Patient would benefit from OT intervention to enhance functional use of right dominant hand and return to her prior level of function.    Barriers to therapy:  None    Goals:   Short Term Goals: decrease pain, increase ADL independence, increase range of motion, increase strength and independent with HEP performance  Short term goal timespan:  1-2 weeks    Long Term Goals:   Patient will have full AROM of right small finger  Patient will improve right  strength to at least 55 pounds to enhance functional use  Patient will score at least 65/80 on the UEFI   Long term goal timespan:  4-6 weeks    Plan:   Occupational/Hand Therapy options:  Occupational therapy treatment to  continue  Planned therapy interventions:  Home exercise training, AROM, A/AROM, PROM, passive stretch, graded resistive tasks to increase strength, pain management, patient/family/caregiver education and orthosis  Other planned therapy interventions:  51870 and digit extension splint (0052) []  Prognosis: excellent    Frequency:  2x week  Duration in weeks:  6  Duration in visits:  12  Discussed with:  Patient  Patient/caregiver understanding of therapy treatment and goals: Good understanding of therapy treatment and goals      Functional Assessment Used:  Upper Extremity Functional Index= 49/80        Referring provider co-signature:  I have reviewed this plan of care and my co-signature certifies the need for services.    Certification Period: 06/26/2023 to  08/07/23    Physician Signature: ________________________________ Date: ______________

## 2023-07-06 ENCOUNTER — OCCUPATIONAL THERAPY (OUTPATIENT)
Dept: OCCUPATIONAL THERAPY | Facility: REHABILITATION | Age: 23
End: 2023-07-06
Payer: COMMERCIAL

## 2023-07-06 DIAGNOSIS — S62.666A CLOSED NONDISPLACED FRACTURE OF DISTAL PHALANX OF RIGHT LITTLE FINGER, INITIAL ENCOUNTER: ICD-10-CM

## 2023-07-06 DIAGNOSIS — M25.641 STIFFNESS OF RIGHT HAND JOINT: ICD-10-CM

## 2023-07-06 PROCEDURE — 97110 THERAPEUTIC EXERCISES: CPT

## 2023-07-06 NOTE — OP THERAPY DAILY TREATMENT
Outpatient Occupational Therapy  DAILY TREATMENT     [facility] Occupational [address]  [address]  [place] [address]  Phone:  [phone]  Fax:  [phone]    Date: 07/06/2023    Patient: Pilar Garcia  YOB: 2000  MRN: [id number]     Time Calculation  Start time: 0845  Stop time: 0930 Time Calculation (min): 45 minutes         Chief Complaint: Extremity Fracture, Hand Weakness, and Hand Injury    Visit #: 2    SUBJECTIVE:  I can make a full fist now      OBJECTIVE:  Current objective measures:      Neurological Testing   Sensation   Wrist/Hand      Right   Diminished: light touch, sharp/dull discrimination and hot/cold discrimination        Active Range of Motion      Right Digits     Little finger       MCP (extension/flexion): 0 / 90       PIP (extension/flexion): 9 / 95       DIP (extension/flexion): 0 / 65     Passive Range of Motion      Right Digits     Little finger        MCP (extension/flexion): 0 / 90       PIP (extension/flexion): 5 / 95       DIP (extension/flexion): 0 / 70     Strength      Left Wrist/Hand       (2nd hand position)     Trial 1: 60     Right Wrist/Hand       (2nd hand position)     Trial 1: 57     Additional Strength Details  3+/5 adductor strength between right 4th and 5th digits.        Therapeutic Exercises (CPT 46491):     1. adduction strengthening of digits    2. AROM and AAROM of right small finger    3. Isolated FDP and FDS strengthening    4. 9# digi-flex, 10 x    5. medium resistance theraputty    Therapeutic Exercise Summary:         Time-based treatments/modalities:  Therapeutic exercise minutes (CPT 21305): 45 minutes        Pain rating before treatment: 0  Pain rating after treatment: 3    ASSESSMENT:   Response to treatment: Patient progressing well in therapy with improved AROM and strength.  Continue with HEP    PLAN/RECOMMENDATIONS:   Plan for treatment: therapy treatment to continue next visit.  Planned  interventions for next visit: continue with current treatment

## 2023-07-10 ENCOUNTER — OCCUPATIONAL THERAPY (OUTPATIENT)
Dept: OCCUPATIONAL THERAPY | Facility: REHABILITATION | Age: 23
End: 2023-07-10
Payer: COMMERCIAL

## 2023-07-10 DIAGNOSIS — S62.666A CLOSED NONDISPLACED FRACTURE OF DISTAL PHALANX OF RIGHT LITTLE FINGER, INITIAL ENCOUNTER: ICD-10-CM

## 2023-07-10 DIAGNOSIS — M25.641 STIFFNESS OF RIGHT HAND JOINT: ICD-10-CM

## 2023-07-10 PROCEDURE — 97110 THERAPEUTIC EXERCISES: CPT

## 2023-07-10 NOTE — OP THERAPY DAILY TREATMENT
Outpatient Occupational Therapy  DAILY TREATMENT     [facility] Occupational [address].  [address]  [place] [address]  Phone:  [phone]  Fax:  [phone]    Date: 07/10/2023    Patient: Pilar Garcia  YOB: 2000  MRN: [id number]     Time Calculation  Start time: 0845  Stop time: 0930 Time Calculation (min): 45 minutes         Chief Complaint: Hand Injury    Visit #: 3    SUBJECTIVE:  I am always working on my finger/ hand.     OBJECTIVE:  Current objective measures:   Neurological Testing   Sensation   Wrist/Hand      Right   Diminished: light touch, sharp/dull discrimination and hot/cold discrimination        Active Range of Motion      Right Digits     Little finger       MCP (extension/flexion): 0 / 90       PIP (extension/flexion): 7 / 95       DIP (extension/flexion): 0 / 65     Passive Range of Motion      Right Digits     Little finger        MCP (extension/flexion): 0 / 90       PIP (extension/flexion): 0 / 95       DIP (extension/flexion): 0 / 70     Strength      Left Wrist/Hand       (2nd hand position)     Trial 1: 60     Right Wrist/Hand       (2nd hand position)     Trial 1: 62     Additional Strength Details  5/5 adductor strength between right 4th and 5th digits.        Therapeutic Exercises (CPT 00571):     1. adduction strengthening of digits    2. AROM and AAROM of right small finger    3. Isolated FDP and FDS strengthening    4. 9# digi-flex, 10 x    5. medium resistance theraputty    Therapeutic Exercise Summary:         Time-based treatments/modalities:  Therapeutic exercise minutes (CPT 08937): 45 minutes        Pain rating before treatment: 4  Pain rating after treatment: 4    ASSESSMENT:   Response to treatment: Patient has done very well with therapy and HEP. All goals met and will discharge from therapy today.      PLAN/RECOMMENDATIONS:   Plan for treatment: no further treatment needed.  Planned interventions for next visit:   Discharged to day and to continue

## 2023-07-10 NOTE — OP THERAPY DISCHARGE SUMMARY
Outpatient Occupational Therapy  DISCHARGE SUMMARY NOTE    [facility] Occupational Therapy [address].  [address]  [place] [address]  Phone:  [phone]  Fax:  [phone]    Date of Visit: 07/10/2023    Patient: Pilar Garcia  YOB: 2000  MRN: [id number]     Referring Provider: Mariana Cordero P.A.-C.  [address] [place] [address]   Referring Diagnosis: Nondisplaced fracture of distal phalanx of right little finger, initial encounter for closed fracture [S62.666A]         Functional Assessment Used:  UEFI = 80/80        Your patient is being discharged from Occupational Therapy with the following comments:   Goals met    Comments:  Patient has done very well with therapy and HEP     Limitations Remaining:  none    Recommendations:  Continue with HEP     SHADY Krueger/L    Date: 7/10/2023

## 2023-07-12 ENCOUNTER — APPOINTMENT (OUTPATIENT)
Dept: OCCUPATIONAL THERAPY | Facility: REHABILITATION | Age: 23
End: 2023-07-12
Payer: COMMERCIAL

## 2023-07-26 DIAGNOSIS — K21.9 GASTROESOPHAGEAL REFLUX DISEASE, UNSPECIFIED WHETHER ESOPHAGITIS PRESENT: ICD-10-CM

## 2023-07-26 RX ORDER — OMEPRAZOLE 20 MG/1
20 CAPSULE, DELAYED RELEASE ORAL DAILY
Qty: 90 CAPSULE | Refills: 1 | Status: SHIPPED | OUTPATIENT
Start: 2023-07-26

## 2023-08-03 ENCOUNTER — APPOINTMENT (OUTPATIENT)
Dept: OCCUPATIONAL THERAPY | Facility: REHABILITATION | Age: 23
End: 2023-08-03
Payer: COMMERCIAL

## 2023-08-07 ENCOUNTER — APPOINTMENT (OUTPATIENT)
Dept: OCCUPATIONAL THERAPY | Facility: REHABILITATION | Age: 23
End: 2023-08-07
Payer: COMMERCIAL

## 2023-08-10 ENCOUNTER — APPOINTMENT (OUTPATIENT)
Dept: OCCUPATIONAL THERAPY | Facility: REHABILITATION | Age: 23
End: 2023-08-10
Payer: COMMERCIAL

## 2023-08-17 ENCOUNTER — APPOINTMENT (OUTPATIENT)
Dept: OCCUPATIONAL THERAPY | Facility: REHABILITATION | Age: 23
End: 2023-08-17
Payer: COMMERCIAL

## 2023-10-14 DIAGNOSIS — Z30.011 ENCOUNTER FOR INITIAL PRESCRIPTION OF CONTRACEPTIVE PILLS: ICD-10-CM

## 2023-10-17 RX ORDER — NORETHINDRONE ACETATE AND ETHINYL ESTRADIOL .02; 1 MG/1; MG/1
1 TABLET ORAL DAILY
Qty: 84 TABLET | Refills: 4 | Status: SHIPPED | OUTPATIENT
Start: 2023-10-17

## 2024-05-14 ENCOUNTER — PATIENT MESSAGE (OUTPATIENT)
Dept: HEALTH INFORMATION MANAGEMENT | Facility: OTHER | Age: 24
End: 2024-05-14

## 2024-05-14 ENCOUNTER — DOCUMENTATION (OUTPATIENT)
Dept: HEALTH INFORMATION MANAGEMENT | Facility: OTHER | Age: 24
End: 2024-05-14
Payer: COMMERCIAL